# Patient Record
Sex: MALE | Race: WHITE | NOT HISPANIC OR LATINO | Employment: OTHER | URBAN - METROPOLITAN AREA
[De-identification: names, ages, dates, MRNs, and addresses within clinical notes are randomized per-mention and may not be internally consistent; named-entity substitution may affect disease eponyms.]

---

## 2017-06-01 ENCOUNTER — TELEPHONE (OUTPATIENT)
Dept: FAMILY MEDICINE | Facility: CLINIC | Age: 47
End: 2017-06-01

## 2017-06-01 NOTE — TELEPHONE ENCOUNTER
JULIEN Cadena, with [facility] clinic called statin. Patient has tenderness, swelling, hot to the touch and pain of right elbow   A. Tetanus up to date   B. Appears to be a skin infection but unable to do any x-ray at [facility] clinic to examine joint further   C. Can patient be seen tonight?   D. Patient afebrile and no streaking present    Writer informed Tammi:  1. No available appts today in clinic but there would be availability tomorrow    Per Tammi:  1. Appt tomorrow is fine.  Appt scheduled on 17 with Dr. Chang.  Appt date, time and location confirmed with Tammi    Writer heard Tammi say to patient while writer scheduling appt:  1. If symptoms worsen, or fever and/or streaking develop, go to Urgent Care tonight or MARIA LUISA Maloney, MOREN, RN

## 2017-06-02 ENCOUNTER — RADIANT APPOINTMENT (OUTPATIENT)
Dept: GENERAL RADIOLOGY | Facility: CLINIC | Age: 47
End: 2017-06-02
Attending: FAMILY MEDICINE
Payer: COMMERCIAL

## 2017-06-02 ENCOUNTER — OFFICE VISIT (OUTPATIENT)
Dept: FAMILY MEDICINE | Facility: CLINIC | Age: 47
End: 2017-06-02
Payer: COMMERCIAL

## 2017-06-02 VITALS
BODY MASS INDEX: 26.49 KG/M2 | SYSTOLIC BLOOD PRESSURE: 136 MMHG | RESPIRATION RATE: 16 BRPM | HEIGHT: 68 IN | HEART RATE: 74 BPM | OXYGEN SATURATION: 98 % | DIASTOLIC BLOOD PRESSURE: 78 MMHG | WEIGHT: 174.75 LBS | TEMPERATURE: 98.8 F

## 2017-06-02 DIAGNOSIS — L03.113 CELLULITIS OF RIGHT ELBOW: ICD-10-CM

## 2017-06-02 DIAGNOSIS — L03.113 CELLULITIS OF RIGHT ELBOW: Primary | ICD-10-CM

## 2017-06-02 LAB
CRP SERPL-MCNC: 28 MG/L (ref 0–8)
ERYTHROCYTE [DISTWIDTH] IN BLOOD BY AUTOMATED COUNT: 13.7 % (ref 10–15)
ERYTHROCYTE [SEDIMENTATION RATE] IN BLOOD BY WESTERGREN METHOD: 7 MM/H (ref 0–15)
HCT VFR BLD AUTO: 45.7 % (ref 40–53)
HGB BLD-MCNC: 15.3 G/DL (ref 13.3–17.7)
MCH RBC QN AUTO: 30.2 PG (ref 26.5–33)
MCHC RBC AUTO-ENTMCNC: 33.5 G/DL (ref 31.5–36.5)
MCV RBC AUTO: 90 FL (ref 78–100)
PLATELET # BLD AUTO: 217 10E9/L (ref 150–450)
RBC # BLD AUTO: 5.07 10E12/L (ref 4.4–5.9)
WBC # BLD AUTO: 7.5 10E9/L (ref 4–11)

## 2017-06-02 PROCEDURE — 85652 RBC SED RATE AUTOMATED: CPT | Performed by: FAMILY MEDICINE

## 2017-06-02 PROCEDURE — 73080 X-RAY EXAM OF ELBOW: CPT | Mod: RT

## 2017-06-02 PROCEDURE — 85027 COMPLETE CBC AUTOMATED: CPT | Performed by: FAMILY MEDICINE

## 2017-06-02 PROCEDURE — 99213 OFFICE O/P EST LOW 20 MIN: CPT | Performed by: FAMILY MEDICINE

## 2017-06-02 PROCEDURE — 36415 COLL VENOUS BLD VENIPUNCTURE: CPT | Performed by: FAMILY MEDICINE

## 2017-06-02 PROCEDURE — 86140 C-REACTIVE PROTEIN: CPT | Performed by: FAMILY MEDICINE

## 2017-06-02 RX ORDER — CEPHALEXIN 500 MG/1
500 CAPSULE ORAL 3 TIMES DAILY
Qty: 30 CAPSULE | Refills: 0 | Status: SHIPPED | OUTPATIENT
Start: 2017-06-02 | End: 2017-12-13

## 2017-06-02 NOTE — MR AVS SNAPSHOT
"              After Visit Summary   6/2/2017    Joaquin Dacosta    MRN: [id number]           Patient Information     Date Of Birth          1970        Visit Information        Provider Department      6/2/2017 9:00 AM Lefty Chang MD [facility]        Today's Diagnoses     Cellulitis of right elbow    -  1       Follow-ups after your visit        Who to contact     If you have questions or need follow up information about today's clinic visit or your schedule please contact [facility] directly at [phone].  Normal or non-critical lab and imaging results will be communicated to you by GetApphart, letter or phone within 4 business days after the clinic has received the results. If you do not hear from us within 7 days, please contact the clinic through NewCondosOnlinet or phone. If you have a critical or abnormal lab result, we will notify you by phone as soon as possible.  Submit refill requests through CompuMed or call your pharmacy and they will forward the refill request to us. Please allow 3 business days for your refill to be completed.          Additional Information About Your Visit        MyChart Information     CompuMed gives you secure access to your electronic health record. If you see a primary care provider, you can also send messages to your care team and make appointments. If you have questions, please call your primary care clinic.  If you do not have a primary care provider, please call [phone] and they will assist you.        Care EveryWhere ID     This is your Care EveryWhere ID. This could be used by other organizations to access your [facility] medical records  [id number]        Your Vitals Were     Pulse Temperature Respirations Height Pulse Oximetry BMI (Body Mass Index)    74 98.8  F (37.1  C) (Oral) 16 5' 8\" (1.727 m) 98% 26.57 kg/m2       Blood Pressure from Last 3 Encounters:   06/02/17 136/78   12/21/16 112/80   07/18/16 110/82    Weight from " Last 3 Encounters:   06/02/17 174 lb 12 oz (79.3 kg)   12/21/16 174 lb (78.9 kg)   07/18/16 168 lb (76.2 kg)              We Performed the Following     CBC with platelets     CRP, inflammation     ESR: Erythrocyte sedimentation rate          Today's Medication Changes          These changes are accurate as of: 6/2/17  1:13 PM.  If you have any questions, ask your nurse or doctor.               Start taking these medicines.        Dose/Directions    cephALEXin 500 MG capsule   Commonly known as:  KEFLEX   Used for:  Cellulitis of right elbow   Started by:  Lefty Chang MD        Dose:  500 mg   Take 1 capsule (500 mg) by mouth 3 times daily   Quantity:  30 capsule   Refills:  0            Where to get your medicines      These medications were sent to [facility] [address] [place] [id number]     Phone:  [phone]     cephALEXin 500 MG capsule                Primary Care Provider Office Phone # Fax #    Lefty Chang [phone]       [address] [address]        Thank you!     Thank you for choosing [facility]  for your care. Our goal is always to provide you with excellent care. Hearing back from our patients is one way we can continue to improve our services. Please take a few minutes to complete the written survey that you may receive in the mail after your visit with us. Thank you!             Your Updated Medication List - Protect others around you: Learn how to safely use, store and throw away your medicines at www.disposemymeds.org.          This list is accurate as of: 6/2/17  1:13 PM.  Always use your most recent med list.                   Brand Name Dispense Instructions for use    ALLEGRA PO      Take  by mouth.       cephALEXin 500 MG capsule    KEFLEX    30 capsule    Take 1 capsule (500 mg) by mouth 3 times daily       melatonin 3 MG Caps       Take 5 mg by mouth       Multi-vitamin Tabs tablet   Generic drug:  multivitamin, therapeutic with minerals     0    1 tab QD       pirbuterol 200 MCG/INH Inhaler    MAXAIR AUTOHALER    1 Inhaler    Inhale 2 puffs into the lungs every 4 hours as needed for wheezing or shortness of breath / dyspnea.       simvastatin 20 MG tablet    ZOCOR    90 tablet    Take 1 tablet (20 mg) by mouth At Bedtime       UNABLE TO FIND      MEDICATION NAME: digestive advantage, OTC       valACYclovir 1000 mg tablet    VALTREX    10 tablet    Take 2 tablets (2,000 mg) by mouth 2 times daily

## 2017-06-02 NOTE — PROGRESS NOTES
SUBJECTIVE:                                                    Joaquin Dacosta is a 46 year old male who presents to clinic today for the following health issues:      Musculoskeletal problem/pain      Duration: 2 days at 2:00am    Description  Location: right elbow    Intensity:  moderate    Accompanying signs and symptoms: warmth, swelling, redness and pain with movement and redness is spreading     History  Previous similar problem: YES- similar with left knee   Previous evaluation:  [facility]     Precipitating or alleviating factors:  Trauma or overuse: no   Aggravating factors include: bumping elbow and pain with movement     Therapies tried and outcome: ice and NSAID - ibuprofen     Additional: [facility] and had a Lumbee drawn and redness has grown      2 days ago - work up with pain.     Bumped few days ago but it was not really hard.     No fever. When started hurting - felt like pulled muscle pain.   Tried - ice - did not help  Ibuprofen helped.    No travel. No history of gout.     Went to [facility] clinic and they marked it and now redness is spreading beyond that.   Currently not painful. This morning - painful.     Problem list and histories reviewed & adjusted, as indicated.  Additional history: as documented    Labs reviewed in EPIC    Reviewed and updated as needed this visit by clinical staff    Reviewed and updated as needed this visit by Provider      Social History     Social History     Marital status:      Spouse name: Ani     Number of children: 2     Years of education: 13     Occupational History     [employer]       Self Employed.     Social History Main Topics     Smoking status: Former Smoker     Smokeless tobacco: Never Used      Comment: tried in  college     Alcohol use Yes      Comment: about  1 -2 drink per day- glass of wine at dinner     Drug use: No     Sexual activity: Yes     Partners: Female     Birth control/ protection:  "Condom     Other Topics Concern      Service No     Blood Transfusions No     Caffeine Concern Not Asked     1 - 2 cups of coffee a day.      Exercise No     Has a physical job. No other exercise besides work.     Seat Belt Yes     Self-Exams No     Parent/Sibling W/ Cabg, Mi Or Angioplasty Before 65f 55m? No     Social History Narrative    11/20/2009    Balanced Diet - Yes    Osteoporosis Preventative measures-  Dairy servings per day: four serving per day    Regular Exercise -  No Describe     Dental Exam up - YES - Date: 06/2009    Eye Exam - NO    Self Testicular Exam -  No    Do you have any concerns about STD's -  No    Abuse: Current or Past (Physical, Sexual or Emotional)- No    Do you feel safe in your environment - Yes    Guns stored in the home - Not Asked    Sunscreen used - Yes    Seatbelts used - Yes    Lipids - YES - Date: 11/20/2009    Glucose -  YES - Date: 11/20/2009    Colon Cancer Screening - No    Hemoccults - NO    PSA - NO    Digital Rectal Exam - YES - Date: 11/20/2009    Immunizations reviewed and up to date - Yes'     Allergies   Allergen Reactions     No Known Drug Allergies      Patient Active Problem List   Diagnosis     Allergic rhinitis     Recurrent herpes labialis     HYPERLIPIDEMIA LDL GOAL <130     Moderate persistent asthma     Reviewed medications, social history and  past medical and surgical history.    Review of system: for general, respiratory, CVS, GI and psychiatry negative except for noted above.     EXAM:  /78 (Cuff Size: Adult Regular)  Pulse 74  Temp 98.8  F (37.1  C) (Oral)  Resp 16  Ht 5' 8\" (1.727 m)  Wt 174 lb 12 oz (79.3 kg)  SpO2 98%  BMI 26.57 kg/m2  Constitutional: healthy, alert and no distress   Psychiatric: mentation appears normal and affect normal/bright  Skin - right elbow : on external surface - around 8 cm x 8 cm erythematous lesion with raised temp, mild tenderness and some bony tenderness, very tiny scab in center.     ASSESSMENT / " PLAN:  (L03.113) Cellulitis of right elbow  (primary encounter diagnosis)  Comment: from appearance. Cbc negative. CRP and ESR pending. Xray negative on my reading. [facility]ed [facility] call. Start with keflex and if getting worse over weekend - go to ER. He agreed.   Plan: CBC with platelets, CRP, inflammation, ESR:         Erythrocyte sedimentation rate, cephALEXin         (KEFLEX) 500 MG capsule, CANCELED: XR Elbow         Right 2 Views

## 2017-06-02 NOTE — NURSING NOTE
"Chief Complaint   Patient presents with     Musculoskeletal Problem     right elbow        Initial /78 (Cuff Size: Adult Regular)  Pulse 74  Temp 98.8  F (37.1  C) (Oral)  Resp 16  Ht 5' 8\" (1.727 m)  Wt 174 lb 12 oz (79.3 kg)  SpO2 98%  BMI 26.57 kg/m2 Estimated body mass index is 26.57 kg/(m^2) as calculated from the following:    Height as of this encounter: 5' 8\" (1.727 m).    Weight as of this encounter: 174 lb 12 oz (79.3 kg).  Medication Reconciliation: complete     Sofia Guerrero CMA      "

## 2017-06-03 ASSESSMENT — ASTHMA QUESTIONNAIRES: ACT_TOTALSCORE: 25

## 2017-12-06 DIAGNOSIS — E78.5 HYPERLIPIDEMIA LDL GOAL <130: ICD-10-CM

## 2017-12-06 NOTE — TELEPHONE ENCOUNTER
Medication Detail      Disp Refills Start End JOSE DANIEL   simvastatin (ZOCOR) 20 MG tablet 90 tablet 3 12/22/2016  No   Sig: Take 1 tablet (20 mg) by mouth At Bedtime     lov 6/2/17

## 2017-12-07 RX ORDER — SIMVASTATIN 20 MG
TABLET ORAL
Qty: 30 TABLET | Refills: 0 | Status: SHIPPED | OUTPATIENT
Start: 2017-12-07 | End: 2017-12-13

## 2017-12-07 NOTE — TELEPHONE ENCOUNTER
Requested Prescriptions   Pending Prescriptions Disp Refills     simvastatin (ZOCOR) 20 MG tablet [Pharmacy Med Name: SIMVASTATIN 20 MG TABLET] 90 tablet 3     Sig: TAKE 1 TABLET BY MOUTH NIGHTLY AT BEDTIME.    Statins Protocol Passed    12/6/2017  9:49 AM       Passed - LDL on file in past 12 months    Recent Labs   Lab Test  12/21/16   0855   LDL  114*            Passed - No abnormal creatine kinase in past 12 months    No lab results found.         Passed - Recent or future visit with authorizing provider    Patient had office visit in the last year or has a visit in the next 30 days with authorizing provider.  See chart review.              Passed - Patient is age 18 or older

## 2017-12-07 NOTE — TELEPHONE ENCOUNTER
Updated health maintenance.   Refilled for a month.  Needs follow up office visit and follow up labs. Please notify patient.

## 2017-12-13 ENCOUNTER — TELEPHONE (OUTPATIENT)
Dept: FAMILY MEDICINE | Facility: CLINIC | Age: 47
End: 2017-12-13

## 2017-12-13 ENCOUNTER — OFFICE VISIT (OUTPATIENT)
Dept: FAMILY MEDICINE | Facility: CLINIC | Age: 47
End: 2017-12-13
Payer: COMMERCIAL

## 2017-12-13 VITALS
TEMPERATURE: 98.1 F | WEIGHT: 181 LBS | SYSTOLIC BLOOD PRESSURE: 123 MMHG | RESPIRATION RATE: 12 BRPM | OXYGEN SATURATION: 97 % | DIASTOLIC BLOOD PRESSURE: 82 MMHG | BODY MASS INDEX: 27.43 KG/M2 | HEART RATE: 64 BPM | HEIGHT: 68 IN

## 2017-12-13 DIAGNOSIS — J45.20 MILD INTERMITTENT ASTHMA WITHOUT COMPLICATION: Primary | ICD-10-CM

## 2017-12-13 DIAGNOSIS — B00.1 RECURRENT HERPES LABIALIS: ICD-10-CM

## 2017-12-13 DIAGNOSIS — E78.5 HYPERLIPIDEMIA LDL GOAL <130: ICD-10-CM

## 2017-12-13 DIAGNOSIS — Z00.00 ROUTINE GENERAL MEDICAL EXAMINATION AT A HEALTH CARE FACILITY: Primary | ICD-10-CM

## 2017-12-13 DIAGNOSIS — J45.20 MILD INTERMITTENT ASTHMA WITHOUT COMPLICATION: ICD-10-CM

## 2017-12-13 DIAGNOSIS — Z23 NEED FOR PROPHYLACTIC VACCINATION AND INOCULATION AGAINST INFLUENZA: ICD-10-CM

## 2017-12-13 LAB
ALBUMIN SERPL-MCNC: 4.1 G/DL (ref 3.4–5)
ALP SERPL-CCNC: 60 U/L (ref 40–150)
ALT SERPL W P-5'-P-CCNC: 42 U/L (ref 0–70)
ANION GAP SERPL CALCULATED.3IONS-SCNC: 8 MMOL/L (ref 3–14)
AST SERPL W P-5'-P-CCNC: 22 U/L (ref 0–45)
BILIRUB SERPL-MCNC: 0.7 MG/DL (ref 0.2–1.3)
BUN SERPL-MCNC: 16 MG/DL (ref 7–30)
CALCIUM SERPL-MCNC: 9.3 MG/DL (ref 8.5–10.1)
CHLORIDE SERPL-SCNC: 107 MMOL/L (ref 94–109)
CHOLEST SERPL-MCNC: 217 MG/DL
CO2 SERPL-SCNC: 25 MMOL/L (ref 20–32)
CREAT SERPL-MCNC: 0.97 MG/DL (ref 0.66–1.25)
GFR SERPL CREATININE-BSD FRML MDRD: 82 ML/MIN/1.7M2
GLUCOSE SERPL-MCNC: 96 MG/DL (ref 70–99)
HDLC SERPL-MCNC: 83 MG/DL
LDLC SERPL CALC-MCNC: 116 MG/DL
NONHDLC SERPL-MCNC: 134 MG/DL
POTASSIUM SERPL-SCNC: 4 MMOL/L (ref 3.4–5.3)
PROT SERPL-MCNC: 7.6 G/DL (ref 6.8–8.8)
SODIUM SERPL-SCNC: 140 MMOL/L (ref 133–144)
TRIGL SERPL-MCNC: 91 MG/DL

## 2017-12-13 PROCEDURE — 99396 PREV VISIT EST AGE 40-64: CPT | Mod: 25 | Performed by: FAMILY MEDICINE

## 2017-12-13 PROCEDURE — 80053 COMPREHEN METABOLIC PANEL: CPT | Performed by: FAMILY MEDICINE

## 2017-12-13 PROCEDURE — 90471 IMMUNIZATION ADMIN: CPT | Performed by: FAMILY MEDICINE

## 2017-12-13 PROCEDURE — 80061 LIPID PANEL: CPT | Performed by: FAMILY MEDICINE

## 2017-12-13 PROCEDURE — 90686 IIV4 VACC NO PRSV 0.5 ML IM: CPT | Performed by: FAMILY MEDICINE

## 2017-12-13 PROCEDURE — 36415 COLL VENOUS BLD VENIPUNCTURE: CPT | Performed by: FAMILY MEDICINE

## 2017-12-13 RX ORDER — SIMVASTATIN 20 MG
TABLET ORAL
Qty: 90 TABLET | Refills: 3 | Status: SHIPPED | OUTPATIENT
Start: 2017-12-13 | End: 2018-12-01

## 2017-12-13 RX ORDER — VALACYCLOVIR HYDROCHLORIDE 1 G/1
TABLET, FILM COATED ORAL
Qty: 4 TABLET | Refills: 11 | Status: SHIPPED | OUTPATIENT
Start: 2017-12-13 | End: 2018-12-07

## 2017-12-13 RX ORDER — ALBUTEROL SULFATE 90 UG/1
2 AEROSOL, METERED RESPIRATORY (INHALATION) EVERY 6 HOURS PRN
Qty: 1 INHALER | Refills: 0 | Status: SHIPPED | OUTPATIENT
Start: 2017-12-13 | End: 2018-12-07

## 2017-12-13 NOTE — MR AVS SNAPSHOT
After Visit Summary   12/13/2017    Joaquin Dacosta    MRN: [id number]           Patient Information     Date Of Birth          1970        Visit Information        Provider Department      12/13/2017 8:00 AM Lefty Chang MD [facility]        Today's Diagnoses     Routine general medical examination at a health care facility    -  1    Need for prophylactic vaccination and inoculation against influenza        Mild intermittent asthma without complication        Recurrent herpes labialis        Hyperlipidemia LDL goal <130          Care Instructions      Preventive Health Recommendations  Male Ages 40 to 49    Yearly exam:             See your health care provider every year in order to  o   Review health changes.   o   Discuss preventive care.    o   Review your medicines if your doctor has prescribed any.    You should be tested each year for STDs (sexually transmitted diseases) if you re at risk.     Have a cholesterol test every 5 years.     Have a colonoscopy (test for colon cancer) if someone in your family has had colon cancer or polyps before age 50.     After age 45, have a diabetes test (fasting glucose). If you are at risk for diabetes, you should have this test every 3 years.      Talk with your health care provider about whether or not a prostate cancer screening test (PSA) is right for you.    Shots: Get a flu shot each year. Get a tetanus shot every 10 years.     Nutrition:    Eat at least 5 servings of fruits and vegetables daily.     Eat whole-grain bread, whole-wheat pasta and brown rice instead of white grains and rice.     Talk to your provider about Calcium and Vitamin D.     Lifestyle    Exercise for at least 150 minutes a week (30 minutes a day, 5 days a week). This will help you control your weight and prevent disease.     Limit alcohol to one drink per day.     No smoking.     Wear sunscreen to prevent skin cancer.     See your dentist every six  "months for an exam and cleaning.              Follow-ups after your visit        Who to contact     If you have questions or need follow up information about today's clinic visit or your schedule please contact [facility] directly at [phone].  Normal or non-critical lab and imaging results will be communicated to you by NuPathehart, letter or phone within 4 business days after the clinic has received the results. If you do not hear from us within 7 days, please contact the clinic through NuPathehart or phone. If you have a critical or abnormal lab result, we will notify you by phone as soon as possible.  Submit refill requests through ZANY OX or call your pharmacy and they will forward the refill request to us. Please allow 3 business days for your refill to be completed.          Additional Information About Your Visit        NuPatheharAcumentrics Information     ZANY OX gives you secure access to your electronic health record. If you see a primary care provider, you can also send messages to your care team and make appointments. If you have questions, please call your primary care clinic.  If you do not have a primary care provider, please call [phone] and they will assist you.        Care EveryWhere ID     This is your Care EveryWhere ID. This could be used by other organizations to access your [facility] medical records  [id number]        Your Vitals Were     Pulse Temperature Respirations Height Pulse Oximetry BMI (Body Mass Index)    64 98.1  F (36.7  C) (Oral) 12 5' 8\" (1.727 m) 97% 27.52 kg/m2       Blood Pressure from Last 3 Encounters:   12/13/17 123/82   06/02/17 136/78   12/21/16 112/80    Weight from Last 3 Encounters:   12/13/17 181 lb (82.1 kg)   06/02/17 174 lb 12 oz (79.3 kg)   12/21/16 174 lb (78.9 kg)              We Performed the Following     Comprehensive metabolic panel     FLU VAC, SPLIT VIRUS IM > 3 YO (QUADRIVALENT) [16520]     Lipid panel reflex to direct LDL Fasting     Vaccine " Administration, Initial [95667]          Today's Medication Changes          These changes are accurate as of: 12/13/17  8:38 AM.  If you have any questions, ask your nurse or doctor.               These medicines have changed or have updated prescriptions.        Dose/Directions    pirbuterol 200 MCG/INH Inhaler   Commonly known as:  MAXAIR   This may have changed:    - when to take this  - reasons to take this   Used for:  Mild intermittent asthma without complication   Changed by:  Lefty Chang MD        Dose:  2 puff   Inhale 2 puffs into the lungs 4 times daily   Quantity:  1 Inhaler   Refills:  0       simvastatin 20 MG tablet   Commonly known as:  ZOCOR   This may have changed:  See the new instructions.   Used for:  Hyperlipidemia LDL goal <130   Changed by:  Lefty Chang MD        TAKE 1 TABLET BY MOUTH NIGHTLY AT BEDTIME.   Quantity:  90 tablet   Refills:  3       valACYclovir 1000 mg tablet   Commonly known as:  VALTREX   This may have changed:    - how much to take  - how to take this  - when to take this  - additional instructions   Used for:  Recurrent herpes labialis   Changed by:  Lefty Chang MD        Take 2 tablets twice per day at the onset of cold sore.   Quantity:  4 tablet   Refills:  11            Where to get your medicines      These medications were sent to [place] [address] [place] [address] [address]     Phone:  [phone]     pirbuterol 200 MCG/INH Inhaler    simvastatin 20 MG tablet    valACYclovir 1000 mg tablet                Primary Care Provider Office Phone # Fax #    Lefty Chang [phone] [address] [place] [address]        Equal Access to Services     [facility] : ilya Aparicio, jen andre. So [facility] [phone].    ATENCIÓN: edison Mcmanus  a hodges disposición servicios gratuitos de asistencia lingüística. Remigio ho [phone].    We comply with applicable federal civil rights laws and [place] laws. We do not discriminate on the basis of race, color, national origin, age, disability, sex, sexual orientation, or gender identity.            Thank you!     Thank you for choosing [facility]  for your care. Our goal is always to provide you with excellent care. Hearing back from our patients is one way we can continue to improve our services. Please take a few minutes to complete the written survey that you may receive in the mail after your visit with us. Thank you!             Your Updated Medication List - Protect others around you: Learn how to safely use, store and throw away your medicines at www.disposemymeds.org.          This list is accurate as of: 12/13/17  8:38 AM.  Always use your most recent med list.                   Brand Name Dispense Instructions for use Diagnosis    ALLEGRA PO      Take  by mouth.    Allergic rhinitis, cause unspecified       ASPIRIN PO      Take 81 mg by mouth daily        melatonin 3 MG Caps      Take 5 mg by mouth        Multi-vitamin Tabs tablet   Generic drug:  multivitamin, therapeutic with minerals     0    1 tab QD        pirbuterol 200 MCG/INH Inhaler    MAXAIR    1 Inhaler    Inhale 2 puffs into the lungs 4 times daily    Mild intermittent asthma without complication       simvastatin 20 MG tablet    ZOCOR    90 tablet    TAKE 1 TABLET BY MOUTH NIGHTLY AT BEDTIME.    Hyperlipidemia LDL goal <130       UNABLE TO FIND      MEDICATION NAME: digestive advantage, OTC        valACYclovir 1000 mg tablet    VALTREX    4 tablet    Take 2 tablets twice per day at the onset of cold sore.    Recurrent herpes labialis

## 2017-12-13 NOTE — TELEPHONE ENCOUNTER
maxair inhaler that was requested is no longer available please send new rx for different inhaler-proair/ventolinlo  lov 12/13/17

## 2017-12-13 NOTE — NURSING NOTE
Per orders of Dr. Chang, injection of flu shot  given by MATHIEU CONCEPCION Patient instructed to remain in clinic for 15 minutes afterwards, and to report any adverse reaction to me immediately.

## 2017-12-14 ASSESSMENT — ASTHMA QUESTIONNAIRES: ACT_TOTALSCORE: 23

## 2018-06-06 ENCOUNTER — RADIANT APPOINTMENT (OUTPATIENT)
Dept: GENERAL RADIOLOGY | Facility: CLINIC | Age: 48
End: 2018-06-06
Attending: INTERNAL MEDICINE
Payer: COMMERCIAL

## 2018-06-06 ENCOUNTER — OFFICE VISIT (OUTPATIENT)
Dept: FAMILY MEDICINE | Facility: CLINIC | Age: 48
End: 2018-06-06
Payer: COMMERCIAL

## 2018-06-06 VITALS
HEART RATE: 83 BPM | WEIGHT: 174 LBS | DIASTOLIC BLOOD PRESSURE: 89 MMHG | HEIGHT: 70 IN | SYSTOLIC BLOOD PRESSURE: 141 MMHG | TEMPERATURE: 97.8 F | OXYGEN SATURATION: 97 % | BODY MASS INDEX: 24.91 KG/M2

## 2018-06-06 DIAGNOSIS — S93.401A SPRAIN OF RIGHT ANKLE, UNSPECIFIED LIGAMENT, INITIAL ENCOUNTER: ICD-10-CM

## 2018-06-06 DIAGNOSIS — S99.911A ANKLE INJURY, RIGHT, INITIAL ENCOUNTER: ICD-10-CM

## 2018-06-06 DIAGNOSIS — S99.911A ANKLE INJURY, RIGHT, INITIAL ENCOUNTER: Primary | ICD-10-CM

## 2018-06-06 PROCEDURE — 99213 OFFICE O/P EST LOW 20 MIN: CPT | Performed by: INTERNAL MEDICINE

## 2018-06-06 PROCEDURE — 73610 X-RAY EXAM OF ANKLE: CPT | Mod: RT

## 2018-06-06 ASSESSMENT — ANXIETY QUESTIONNAIRES
GAD7 TOTAL SCORE: 0
6. BECOMING EASILY ANNOYED OR IRRITABLE: NOT AT ALL
2. NOT BEING ABLE TO STOP OR CONTROL WORRYING: NOT AT ALL
IF YOU CHECKED OFF ANY PROBLEMS ON THIS QUESTIONNAIRE, HOW DIFFICULT HAVE THESE PROBLEMS MADE IT FOR YOU TO DO YOUR WORK, TAKE CARE OF THINGS AT HOME, OR GET ALONG WITH OTHER PEOPLE: NOT DIFFICULT AT ALL
3. WORRYING TOO MUCH ABOUT DIFFERENT THINGS: NOT AT ALL
7. FEELING AFRAID AS IF SOMETHING AWFUL MIGHT HAPPEN: NOT AT ALL
5. BEING SO RESTLESS THAT IT IS HARD TO SIT STILL: NOT AT ALL
1. FEELING NERVOUS, ANXIOUS, OR ON EDGE: NOT AT ALL

## 2018-06-06 ASSESSMENT — PATIENT HEALTH QUESTIONNAIRE - PHQ9: 5. POOR APPETITE OR OVEREATING: NOT AT ALL

## 2018-06-06 NOTE — PATIENT INSTRUCTIONS
ibuprofen 3 x day with food   ice, elevate, rest, splint: tie up splint  Start range of motion / ABCs in few days to maintain range of motion     Recheck with Podiatry if not improved as expected.    Ankle sprain handout given        Self-Care for Strains and Sprains  Most minor strains and sprains can be treated with self-care. Recovering from a strain or sprain may take 6 to 8 weeks. Your self-care goal is to reduce pain and immobilize the injury to speed healing.     A sprain injures ligaments (tissue that connects bones to bones).      A strain injures muscles or tendons (tissue that connects muscles to bones).   Support the injured area  Wrapping the injured area provides support for short, necessary activities. Be careful not to wrap the area too tightly. This could cut off the blood supply.    Support a wrist, elbow, or shoulder with a sling.    Wrap an ankle or knee with an elastic bandage.    Tape a finger or toe to the one next to it.  Use cold and heat  Cold reduces swelling. Both cold and heat reduce pain. Heat should not be used in the initial treatment of the injury. When using cold or heat, always place a towel between the pack and your skin.    Apply ice or a cold pack 10 to 15 minutes every hour you re awake for the first 2 days.    After the swelling goes down, use cold or heat to control pain. Don t use heat late in the day, since it can cause swelling when you re not active.  Rest and elevate  Rest and elevation help your injury heal faster.    Raise the injured area above your heart level.    Keep the injured area from moving.    Limit the use of the joint or limb.  Use medicine    Aspirin reduces pain and swelling. (Note: Don t give aspirin to a child 18 or younger unless prescribed by the doctor.)    Aspirin substitutes, such as ibuprofen, can reduce pain. Some substitutes reduce swelling, too. Ask your pharmacist which substitutes you can use.  Call your doctor if:    The injured joint won t  move, or bones make a grating sound when they move.    You can t put weight on the injured area, even after 24 hours.    The injured body part is cold, blue, or numb.    The joint or limb appears bent or crooked.    Pain increases or doesn t improve in 4 days.    When pressing along the injured area, you notice a spot that is especially painful.   Date Last Reviewed: 9/29/2015 2000-2017 [employer]. [address] [place] [address], [place] [address]. All rights reserved. This information is not intended as a substitute for professional medical care. Always follow your healthcare professional's instructions.

## 2018-06-06 NOTE — MR AVS SNAPSHOT
After Visit Summary   6/6/2018    Joaquin Dacosta    MRN: [id number]           Patient Information     Date Of Birth          1970        Visit Information        Provider Department      6/6/2018 3:00 PM Bing Harris MD [facility]        Today's Diagnoses     Ankle injury, right, initial encounter    -  1    Sprain of right ankle, unspecified ligament, initial encounter          Care Instructions    ibuprofen 3 x day with food   ice, elevate, rest, splint: tie up splint  Start range of motion / ABCs in few days to maintain range of motion     Recheck with Podiatry if not improved as expected.                Follow-ups after your visit        Who to contact     If you have questions or need follow up information about today's clinic visit or your schedule please contact [facility] directly at [phone].  Normal or non-critical lab and imaging results will be communicated to you by [employer], letter or phone within 4 business days after the clinic has received the results. If you do not hear from us within 7 days, please contact the clinic through [employer] or phone. If you have a critical or abnormal lab result, we will notify you by phone as soon as possible.  Submit refill requests through [employer] or call your pharmacy and they will forward the refill request to us. Please allow 3 business days for your refill to be completed.          Additional Information About Your Visit        MyChart Information     [employer] gives you secure access to your electronic health record. If you see a primary care provider, you can also send messages to your care team and make appointments. If you have questions, please call your primary care clinic.  If you do not have a primary care provider, please call [phone] and they will assist you.        Care EveryWhere ID     This is your Care EveryWhere ID. This could be used by other organizations to access your  "[place] medical records  [id number]        Your Vitals Were     Pulse Temperature Height Pulse Oximetry BMI (Body Mass Index)       83 97.8  F (36.6  C) (Tympanic) 5' 9.5\" (1.765 m) 97% 25.33 kg/m2        Blood Pressure from Last 3 Encounters:   06/06/18 141/89   12/13/17 123/82   06/02/17 136/78    Weight from Last 3 Encounters:   06/06/18 174 lb (78.9 kg)   12/13/17 181 lb (82.1 kg)   06/02/17 174 lb 12 oz (79.3 kg)                 Today's Medication Changes          These changes are accurate as of 6/6/18  3:43 PM.  If you have any questions, ask your nurse or doctor.               Start taking these medicines.        Dose/Directions    order for DME   Used for:  Sprain of right ankle, unspecified ligament, initial encounter, Ankle injury, right, initial encounter   Started by:  Bing Harris MD        Equipment being ordered: tie up ankle splint   Quantity:  1 Device   Refills:  0            Where to get your medicines      Some of these will need a paper prescription and others can be bought over the counter.  Ask your nurse if you have questions.     Bring a paper prescription for each of these medications     order for DME                Primary Care Provider Office Phone # Fax #    Lefty Lakshmi Chagn [phone] [address] [place] [id number]        Equal Access to Services     [facility] NATHANIEL AH: Hadii christian wong Solori, waaxda luqadaha, qaybta kaalmada darnell, jen echevarria. So [facility] [phone].    ATENCIÓN: Si habla español, tiene a hodges disposición servicios gratuitos de asistencia lingüística. Brantame al [phone].    We comply with applicable federal civil rights laws and [place] laws. We do not discriminate on the basis of race, color, national origin, age, disability, sex, sexual orientation, or gender identity.            Thank you!     Thank you for choosing [facility]  for your care. Our goal is " always to provide you with excellent care. Hearing back from our patients is one way we can continue to improve our services. Please take a few minutes to complete the written survey that you may receive in the mail after your visit with us. Thank you!             Your Updated Medication List - Protect others around you: Learn how to safely use, store and throw away your medicines at www.disposemymeds.org.          This list is accurate as of 6/6/18  3:43 PM.  Always use your most recent med list.                   Brand Name Dispense Instructions for use Diagnosis    albuterol 108 (90 Base) MCG/ACT Inhaler    PROAIR HFA/PROVENTIL HFA/VENTOLIN HFA    1 Inhaler    Inhale 2 puffs into the lungs every 6 hours as needed for shortness of breath / dyspnea or wheezing    Mild intermittent asthma without complication       ALLEGRA PO      Take  by mouth.    Allergic rhinitis, cause unspecified       ASPIRIN PO      Take 81 mg by mouth daily        melatonin 3 MG Caps      Take 5 mg by mouth        Multi-vitamin Tabs tablet   Generic drug:  multivitamin, therapeutic with minerals     0    1 tab QD        order for DME     1 Device    Equipment being ordered: tie up ankle splint    Sprain of right ankle, unspecified ligament, initial encounter, Ankle injury, right, initial encounter       simvastatin 20 MG tablet    ZOCOR    90 tablet    TAKE 1 TABLET BY MOUTH NIGHTLY AT BEDTIME.    Hyperlipidemia LDL goal <130       UNABLE TO FIND      MEDICATION NAME: digestive advantage, OTC        valACYclovir 1000 mg tablet    VALTREX    4 tablet    Take 2 tablets twice per day at the onset of cold sore.    Recurrent herpes labialis

## 2018-06-06 NOTE — PROGRESS NOTES
SUBJECTIVE:   Joaquin Dacosta is a 47 year old male presenting with a chief complaint of   Chief Complaint   Patient presents with     Trauma     Pt in clinic to have eval for right ankle pain due to injury.       He is an established patient of [facility].    MS Injury/Pain    Onset of symptoms was today   Location: right ankle  Context:       The injury happened while getting out of his work van      Mechanism: twisted ankle by setting foot out on rock.      Patient experienced immediate pain, immediate swelling, no deformity was noted by the patient, able to limp  Course of symptoms is worsening.      Current and Associated symptoms: Pain, Swelling and Decreased range of motion  Denies  Bruising  Aggravating Factors: weight-bearing  Therapies to improve symptoms include: ice, rest, elevation and wrapping with Ace bandage  This is not the first time this type of problem has occurred for this patient, but worse this time.       Review of Systems    Past Medical History:   Diagnosis Date     Allergic rhinitis, cause unspecified      Hyperlipidemia LDL goal <130      Moderate persistent asthma      Recurrent herpes labialis 10/28/2008    Cold Sores - Takes Valtrex .prn     Family History   Problem Relation Age of Onset     Breast Cancer Mother      Lipids Mother      Respiratory Father      asthma     Alzheimer Disease Maternal Grandmother      CANCER Maternal Grandmother      Cancer - colorectal Maternal Grandfather      Current Outpatient Prescriptions   Medication Sig Dispense Refill     albuterol (PROAIR HFA/PROVENTIL HFA/VENTOLIN HFA) 108 (90 BASE) MCG/ACT Inhaler Inhale 2 puffs into the lungs every 6 hours as needed for shortness of breath / dyspnea or wheezing 1 Inhaler 0     ASPIRIN PO Take 81 mg by mouth daily       Fexofenadine HCl (ALLEGRA PO) Take  by mouth.       melatonin 3 MG CAPS Take 5 mg by mouth        MULTI-VITAMIN OR TABS 1 tab QD  0 0     order for DME Equipment being ordered: tie up ankle splint  "1 Device 0     simvastatin (ZOCOR) 20 MG tablet TAKE 1 TABLET BY MOUTH NIGHTLY AT BEDTIME. 90 tablet 3     UNABLE TO FIND MEDICATION NAME: digestive advantage, OTC       valACYclovir (VALTREX) 1000 mg tablet Take 2 tablets twice per day at the onset of cold sore. 4 tablet 11     Social History   Substance Use Topics     Smoking status: Former Smoker     Smokeless tobacco: Never Used      Comment: tried in  college     Alcohol use Yes      Comment: about  1 -2 drink per day- glass of wine at dinner       OBJECTIVE  /89  Pulse 83  Temp 97.8  F (36.6  C) (Tympanic)  Ht 5' 9.5\" (1.765 m)  Wt 174 lb (78.9 kg)  SpO2 97%  BMI 25.33 kg/m2    Physical Exam   Constitutional: He appears well-developed and well-nourished.   Musculoskeletal:   right  ankle  Exam of the injured ankle reveals swelling, ecchymosis and tenderness over the lateral malleolus with swelling at medial ligaminets. No tenderness over the medial malleolus of the ankle. The fifth metatarsal is not tender.  Tarsals, metatarsals and toes nontender    The rest of the foot, ankle and leg exam is normal.         Labs:  No results found for this or any previous visit (from the past 24 hour(s)).    X-Ray was done, my findings are: no fracture     ASSESSMENT:      ICD-10-CM    1. Ankle injury, right, initial encounter S99.911A XR Ankle Right G/E 3 Views     order for DME   2. Sprain of right ankle, unspecified ligament, initial encounter S93.401A order for DME      Recommend f.u. Asthma with pcp    Medical Decision Making:    Differential Diagnosis:  MS Injury Pain: sprain and fracture    Serious Comorbid Conditions:  Adult:  None    PLAN:    MS Injury/Pain ice, elevate, rest, splint: tie up splint   and Ibuprofen    Followup:        Patient Instructions   ibuprofen 3 x day with food   ice, elevate, rest, splint: tie up splint  Start range of motion / ABCs in few days to maintain range of motion     Recheck with Podiatry if not improved as " expected.

## 2018-06-07 ASSESSMENT — PATIENT HEALTH QUESTIONNAIRE - PHQ9: SUM OF ALL RESPONSES TO PHQ QUESTIONS 1-9: 0

## 2018-06-07 ASSESSMENT — ANXIETY QUESTIONNAIRES: GAD7 TOTAL SCORE: 0

## 2018-12-01 DIAGNOSIS — E78.5 HYPERLIPIDEMIA LDL GOAL <130: ICD-10-CM

## 2018-12-03 RX ORDER — SIMVASTATIN 20 MG
TABLET ORAL
Qty: 90 TABLET | Refills: 0 | Status: SHIPPED | OUTPATIENT
Start: 2018-12-03 | End: 2018-12-07

## 2018-12-03 NOTE — TELEPHONE ENCOUNTER
Medication is being filled for 1 time refill only due to:  Patient needs labs fasting lipids.   Sandie Ling RN

## 2018-12-03 NOTE — TELEPHONE ENCOUNTER
"Requested Prescriptions   Pending Prescriptions Disp Refills     simvastatin (ZOCOR) 20 MG tablet [Pharmacy Med Name: SIMVASTATIN 20 MG TABLET]  Last Written Prescription Date:  12/13/2017  Last Fill Quantity: 90 tablet,  # refills: 3   Last Office Visit: 6/6/2018   Future Office Visit:      90 tablet 3     Sig: TAKE 1 TABLET BY MOUTH NIGHTLY AT BEDTIME.    Statins Protocol Passed    12/1/2018 10:49 AM       Passed - LDL on file in past 12 months    Recent Labs   Lab Test  12/13/17   0847   LDL  116*          Passed - No abnormal creatine kinase in past 12 months    Recent Labs   Lab Test  07/18/16   1024   CKT  79           Passed - Recent (12 mo) or future (30 days) visit within the authorizing provider's specialty    Patient had office visit in the last 12 months or has a visit in the next 30 days with authorizing provider or within the authorizing provider's specialty.  See \"Patient Info\" tab in inbasket, or \"Choose Columns\" in Meds & Orders section of the refill encounter.           Passed - Patient is age 18 or older          "

## 2018-12-07 ENCOUNTER — OFFICE VISIT (OUTPATIENT)
Dept: FAMILY MEDICINE | Facility: CLINIC | Age: 48
End: 2018-12-07
Payer: COMMERCIAL

## 2018-12-07 VITALS
OXYGEN SATURATION: 97 % | SYSTOLIC BLOOD PRESSURE: 118 MMHG | WEIGHT: 179.25 LBS | HEIGHT: 68 IN | HEART RATE: 63 BPM | RESPIRATION RATE: 15 BRPM | BODY MASS INDEX: 27.17 KG/M2 | DIASTOLIC BLOOD PRESSURE: 82 MMHG | TEMPERATURE: 97.6 F

## 2018-12-07 DIAGNOSIS — Z23 NEED FOR PROPHYLACTIC VACCINATION AND INOCULATION AGAINST INFLUENZA: ICD-10-CM

## 2018-12-07 DIAGNOSIS — J45.20 MILD INTERMITTENT ASTHMA WITHOUT COMPLICATION: ICD-10-CM

## 2018-12-07 DIAGNOSIS — Z00.00 ROUTINE GENERAL MEDICAL EXAMINATION AT A HEALTH CARE FACILITY: Primary | ICD-10-CM

## 2018-12-07 DIAGNOSIS — L30.9 ECZEMA, UNSPECIFIED TYPE: ICD-10-CM

## 2018-12-07 DIAGNOSIS — E78.5 HYPERLIPIDEMIA LDL GOAL <130: ICD-10-CM

## 2018-12-07 DIAGNOSIS — Z11.4 SCREENING FOR HIV WITHOUT PRESENCE OF RISK FACTORS: ICD-10-CM

## 2018-12-07 DIAGNOSIS — B00.1 RECURRENT HERPES LABIALIS: ICD-10-CM

## 2018-12-07 LAB
ALBUMIN SERPL-MCNC: 4.1 G/DL (ref 3.4–5)
ALP SERPL-CCNC: 63 U/L (ref 40–150)
ALT SERPL W P-5'-P-CCNC: 49 U/L (ref 0–70)
ANION GAP SERPL CALCULATED.3IONS-SCNC: 6 MMOL/L (ref 3–14)
AST SERPL W P-5'-P-CCNC: 26 U/L (ref 0–45)
BILIRUB SERPL-MCNC: 0.6 MG/DL (ref 0.2–1.3)
BUN SERPL-MCNC: 16 MG/DL (ref 7–30)
CALCIUM SERPL-MCNC: 9.6 MG/DL (ref 8.5–10.1)
CHLORIDE SERPL-SCNC: 105 MMOL/L (ref 94–109)
CHOLEST SERPL-MCNC: 243 MG/DL
CO2 SERPL-SCNC: 26 MMOL/L (ref 20–32)
CREAT SERPL-MCNC: 1 MG/DL (ref 0.66–1.25)
GFR SERPL CREATININE-BSD FRML MDRD: 79 ML/MIN/1.7M2
GLUCOSE SERPL-MCNC: 97 MG/DL (ref 70–99)
HDLC SERPL-MCNC: 74 MG/DL
HIV 1+2 AB+HIV1 P24 AG SERPL QL IA: NONREACTIVE
LDLC SERPL CALC-MCNC: 150 MG/DL
NONHDLC SERPL-MCNC: 169 MG/DL
POTASSIUM SERPL-SCNC: 4.3 MMOL/L (ref 3.4–5.3)
PROT SERPL-MCNC: 7.5 G/DL (ref 6.8–8.8)
SODIUM SERPL-SCNC: 137 MMOL/L (ref 133–144)
TRIGL SERPL-MCNC: 97 MG/DL

## 2018-12-07 PROCEDURE — 99396 PREV VISIT EST AGE 40-64: CPT | Mod: 25 | Performed by: FAMILY MEDICINE

## 2018-12-07 PROCEDURE — 36415 COLL VENOUS BLD VENIPUNCTURE: CPT | Performed by: FAMILY MEDICINE

## 2018-12-07 PROCEDURE — 99212 OFFICE O/P EST SF 10 MIN: CPT | Mod: 25 | Performed by: FAMILY MEDICINE

## 2018-12-07 PROCEDURE — 90471 IMMUNIZATION ADMIN: CPT | Performed by: FAMILY MEDICINE

## 2018-12-07 PROCEDURE — 80053 COMPREHEN METABOLIC PANEL: CPT | Performed by: FAMILY MEDICINE

## 2018-12-07 PROCEDURE — 87389 HIV-1 AG W/HIV-1&-2 AB AG IA: CPT | Performed by: FAMILY MEDICINE

## 2018-12-07 PROCEDURE — 90686 IIV4 VACC NO PRSV 0.5 ML IM: CPT | Performed by: FAMILY MEDICINE

## 2018-12-07 PROCEDURE — 80061 LIPID PANEL: CPT | Performed by: FAMILY MEDICINE

## 2018-12-07 RX ORDER — SIMVASTATIN 20 MG
TABLET ORAL
Qty: 90 TABLET | Refills: 2 | Status: SHIPPED | OUTPATIENT
Start: 2019-03-01 | End: 2020-01-06

## 2018-12-07 RX ORDER — ALBUTEROL SULFATE 90 UG/1
2 AEROSOL, METERED RESPIRATORY (INHALATION) EVERY 6 HOURS PRN
Qty: 1 INHALER | Refills: 0 | Status: SHIPPED | OUTPATIENT
Start: 2018-12-07

## 2018-12-07 RX ORDER — NYSTATIN AND TRIAMCINOLONE ACETONIDE 100000; 1 [USP'U]/G; MG/G
OINTMENT TOPICAL 2 TIMES DAILY
Qty: 30 G | Refills: 1 | Status: SHIPPED | OUTPATIENT
Start: 2018-12-07 | End: 2020-01-06

## 2018-12-07 RX ORDER — VALACYCLOVIR HYDROCHLORIDE 1 G/1
TABLET, FILM COATED ORAL
Qty: 4 TABLET | Refills: 11 | Status: SHIPPED | OUTPATIENT
Start: 2018-12-07 | End: 2020-01-06

## 2018-12-07 ASSESSMENT — ENCOUNTER SYMPTOMS
COUGH: 0
JOINT SWELLING: 0
HEMATOCHEZIA: 0
PALPITATIONS: 0
SHORTNESS OF BREATH: 0
CONSTIPATION: 0
DIARRHEA: 0
DYSURIA: 0
CHILLS: 0
FREQUENCY: 0
PARESTHESIAS: 0
DIZZINESS: 0
HEMATURIA: 0
WEAKNESS: 0
NERVOUS/ANXIOUS: 0
HEADACHES: 0
NAUSEA: 0
EYE PAIN: 0
ARTHRALGIAS: 0
ABDOMINAL PAIN: 0
MYALGIAS: 0
SORE THROAT: 0
FEVER: 0
HEARTBURN: 0

## 2018-12-07 NOTE — PROGRESS NOTES
SUBJECTIVE:   CC: Joaquin Dacosta is an 48 year old male who presents for preventative health visit.     Physical   Annual:     Getting at least 3 servings of Calcium per day:  Yes    Bi-annual eye exam:  Yes    Dental care twice a year:  Yes    Sleep apnea or symptoms of sleep apnea:  Excessive snoring    Diet:  Regular (no restrictions)    Frequency of exercise:  None    Taking medications regularly:  Yes    Medication side effects:  None    Additional concerns today:  Yes    PHQ-2 Total Score: 0               Medical assistant advised patient about addressing additional health concerns that could be billed, as it is not considered a part of a preventive physical. Patient verbalized understanding.    Lefty Chang MD, MD on 12/7/2018 at 8:17 AM    Derm        Duration: 1 month    Description (location/character/radiation): right thumb    Intensity:  mild, moderate     Accompanying signs and symptoms: crack skin    History (similar episodes/previous evaluation): None    Precipitating or alleviating factors: drys out, and the cold weather.    Therapies tried and outcome: neosporin , benadryl and bandage            Today's PHQ-2 Score:   PHQ-2 ( 1999 Pfizer) 12/7/2018   Q1: Little interest or pleasure in doing things 0   Q2: Feeling down, depressed or hopeless 0   PHQ-2 Score 0   Q1: Little interest or pleasure in doing things Not at all   Q2: Feeling down, depressed or hopeless Not at all   PHQ-2 Score 0       Abuse: Current or Past(Physical, Sexual or Emotional)- No  Do you feel safe in your environment? Yes    Social History   Substance Use Topics     Smoking status: Former Smoker     Smokeless tobacco: Never Used      Comment: tried in  college     Alcohol use Yes      Comment: about  1 -2 drink per day- glass of wine at dinner     Alcohol Use 12/7/2018   If you drink alcohol do you typically have greater than 3 drinks per day OR greater than 7 drinks per week? Yes   AUDIT SCORE  5     AUDIT - Alcohol  Use Disorders Identification Test - Reproduced from the World Health Organization Audit 2001 (Second Edition) 12/7/2018   1.  How often do you have a drink containing alcohol? 4 or more times a week   2.  How many drinks containing alcohol do you have on a typical day when you are drinking? 1 or 2   3.  How often do you have five or more drinks on one occasion? Less than monthly   4.  How often during the last year have you found that you were not able to stop drinking once you had started? Never   5.  How often during the last year have you failed to do what was normally expected of you because of drinking? Never   6.  How often during the last year have you needed a first drink in the morning to get yourself going after a heavy drinking session? Never   7.  How often during the last year have you had a feeling of guilt or remorse after drinking? Never   8.  How often during the last year have you been unable to remember what happened the night before because of your drinking? Never   9.  Have you or someone else been injured because of your drinking? No   10. Has a relative, friend, doctor or other health care worker been concerned about your drinking or suggested you cut down? No   TOTAL SCORE 5       Last PSA: No results found for: PSA    Reviewed orders with patient. Reviewed health maintenance and updated orders accordingly - Yes  Labs reviewed in EPIC    Reviewed and updated as needed this visit by clinical staff  Tobacco  Allergies  Meds  Med Hx  Surg Hx  Fam Hx  Soc Hx      Reviewed and updated as needed this visit by Provider    Right handed. Not improving. Small crack and now it is spreading.            Review of Systems   Constitutional: Negative for chills and fever.   HENT: Negative for congestion, ear pain, hearing loss and sore throat.    Eyes: Negative for pain and visual disturbance.   Respiratory: Negative for cough and shortness of breath.    Cardiovascular: Negative for chest pain,  "palpitations and peripheral edema.   Gastrointestinal: Negative for abdominal pain, constipation, diarrhea, heartburn, hematochezia and nausea.   Genitourinary: Negative for discharge, dysuria, frequency, genital sores, hematuria, impotence and urgency.   Musculoskeletal: Negative for arthralgias, joint swelling and myalgias.   Skin: Negative for rash.   Neurological: Negative for dizziness, weakness, headaches and paresthesias.   Psychiatric/Behavioral: Negative for mood changes. The patient is not nervous/anxious.      OBJECTIVE:   /82 (BP Location: Left arm, Patient Position: Chair, Cuff Size: Adult Large)  Pulse 63  Temp 97.6  F (36.4  C) (Oral)  Resp 15  Ht 5' 8\" (1.727 m)  Wt 179 lb 4 oz (81.3 kg)  SpO2 97%  BMI 27.25 kg/m2    Physical Exam  GENERAL: healthy, alert and no distress  EYES: Eyes grossly normal to inspection, PERRL and conjunctivae and sclerae normal  HENT: ear canals and TM's normal, nose and mouth without ulcers or lesions  NECK: no adenopathy, no asymmetry, masses, or scars and thyroid normal to palpation  RESP: lungs clear to auscultation - no rales, rhonchi or wheezes  CV: regular rate and rhythm, normal S1 S2, no S3 or S4, no murmur, click or rub, no peripheral edema and peripheral pulses strong  ABDOMEN: soft, nontender, no hepatosplenomegaly, no masses and bowel sounds normal   (male): normal male genitalia without lesions or urethral discharge, no hernia, right testicle smaller than left.   MS: no gross musculoskeletal defects noted, no edema  SKIN:right thumb on dorsum distal interphalangeal joint - erythematous patch with some skin break down.   NEURO: Normal strength and tone, mentation intact and speech normal  PSYCH: mentation appears normal, affect normal/bright    ASSESSMENT/PLAN:   1. Routine general medical examination at a health care facility       2. Eczema, unspecified type  With concern of some underlying fungal infection. Trial of steroid+antifungal combo " "cream. If not improving in 2-3 weeks - call for derm referral. Avoid irritant.   - nystatin-triamcinolone (MYCOLOG) 592628-1.1 UNIT/GM-% external ointment; Apply topically 2 times daily For 3 weeks.  Dispense: 30 g; Refill: 1    3. Mild intermittent asthma without complication     - albuterol (PROAIR HFA/PROVENTIL HFA/VENTOLIN HFA) 108 (90 Base) MCG/ACT inhaler; Inhale 2 puffs into the lungs every 6 hours as needed for shortness of breath / dyspnea or wheezing  Dispense: 1 Inhaler; Refill: 0    4. Hyperlipidemia LDL goal <130     - Lipid panel reflex to direct LDL Fasting  - Comprehensive metabolic panel  - simvastatin (ZOCOR) 20 MG tablet; TAKE 1 TABLET BY MOUTH NIGHTLY AT BEDTIME.  Dispense: 90 tablet; Refill: 2    5. Recurrent herpes labialis     - valACYclovir (VALTREX) 1000 mg tablet; Take 2 tablets twice per day at the onset of cold sore.  Dispense: 4 tablet; Refill: 11    6. Screening for HIV without presence of risk factors     - HIV Antigen Antibody Combo    7. Need for prophylactic vaccination and inoculation against influenza     - FLU VACCINE, SPLIT VIRUS, IM (QUADRIVALENT) [51166]- >3 YRS  - Vaccine Administration, Initial [89593]    COUNSELING:   Reviewed preventive health counseling, as reflected in patient instructions  Special attention given to:        Regular exercise       Healthy diet/nutrition       Vision screening       Hearing screening       Colon cancer screening    BP Readings from Last 1 Encounters:   12/07/18 118/82     Estimated body mass index is 27.25 kg/(m^2) as calculated from the following:    Height as of this encounter: 5' 8\" (1.727 m).    Weight as of this encounter: 179 lb 4 oz (81.3 kg).      Weight management plan: Discussed healthy diet and exercise guidelines     reports that he has quit smoking. He has never used smokeless tobacco.      Counseling Resources:  ATP IV Guidelines  Pooled Cohorts Equation Calculator  FRAX Risk Assessment  ICSI Preventive Guidelines  Dietary " Guidelines for Americans, 2010  USDA's MyPlate  ASA Prophylaxis  Lung CA Screening    Lefty Chang MD, MD  [facility]      Injectable Influenza Immunization Documentation    1.  Is the person to be vaccinated sick today?   No    2. Does the person to be vaccinated have an allergy to a component   of the vaccine?   No  Egg Allergy Algorithm Link    3. Has the person to be vaccinated ever had a serious reaction   to influenza vaccine in the past?   No    4. Has the person to be vaccinated ever had Guillain-Barré syndrome?   No    Form completed by Tracy Moncada MA

## 2018-12-07 NOTE — PROGRESS NOTES

## 2018-12-07 NOTE — MR AVS SNAPSHOT
After Visit Summary   12/7/2018    Joaquin Dacosta    MRN: [id number]           Patient Information     Date Of Birth          1970        Visit Information        Provider Department      12/7/2018 8:00 AM Lefty Chang MD [facility]        Today's Diagnoses     Routine general medical examination at a health care facility    -  1    Eczema, unspecified type        Mild intermittent asthma without complication        Hyperlipidemia LDL goal <130        Recurrent herpes labialis          Care Instructions      Preventive Health Recommendations  Male Ages 40 to 49    Yearly exam:             See your health care provider every year in order to  o   Review health changes.   o   Discuss preventive care.    o   Review your medicines if your doctor has prescribed any.    You should be tested each year for STDs (sexually transmitted diseases) if you re at risk.     Have a cholesterol test every 5 years.     Have a colonoscopy (test for colon cancer) if someone in your family has had colon cancer or polyps before age 50.     After age 45, have a diabetes test (fasting glucose). If you are at risk for diabetes, you should have this test every 3 years.      Talk with your health care provider about whether or not a prostate cancer screening test (PSA) is right for you.    Shots: Get a flu shot each year. Get a tetanus shot every 10 years.     Nutrition:    Eat at least 5 servings of fruits and vegetables daily.     Eat whole-grain bread, whole-wheat pasta and brown rice instead of white grains and rice.     Get adequate Calcium and Vitamin D.     Lifestyle    Exercise for at least 150 minutes a week (30 minutes a day, 5 days a week). This will help you control your weight and prevent disease.     Limit alcohol to one drink per day.     No smoking.     Wear sunscreen to prevent skin cancer.     See your dentist every six months for an exam and cleaning.              Follow-ups  "after your visit        Who to contact     If you have questions or need follow up information about today's clinic visit or your schedule please contact [facility] directly at [phone].  Normal or non-critical lab and imaging results will be communicated to you by MyChart, letter or phone within 4 business days after the clinic has received the results. If you do not hear from us within 7 days, please contact the clinic through MyChart or phone. If you have a critical or abnormal lab result, we will notify you by phone as soon as possible.  Submit refill requests through [employer] or call your pharmacy and they will forward the refill request to us. Please allow 3 business days for your refill to be completed.          Additional Information About Your Visit        [facility] Information     [employer] gives you secure access to your electronic health record. If you see a primary care provider, you can also send messages to your care team and make appointments. If you have questions, please call your primary care clinic.  If you do not have a primary care provider, please call [phone] and they will assist you.        Care EveryWhere ID     This is your Care EveryWhere ID. This could be used by other organizations to access your [facility] medical records  [id number]        Your Vitals Were     Pulse Temperature Respirations Height Pulse Oximetry BMI (Body Mass Index)    63 97.6  F (36.4  C) (Oral) 15 5' 8\" (1.727 m) 97% 27.25 kg/m2       Blood Pressure from Last 3 Encounters:   12/07/18 118/82   06/06/18 141/89   12/13/17 123/82    Weight from Last 3 Encounters:   12/07/18 179 lb 4 oz (81.3 kg)   06/06/18 174 lb (78.9 kg)   12/13/17 181 lb (82.1 kg)              We Performed the Following     Comprehensive metabolic panel     Lipid panel reflex to direct LDL Fasting     PAF COMPLETED          Today's Medication Changes          These changes are accurate as of 12/7/18  8:44 AM.  If you have any " questions, ask your nurse or doctor.               Start taking these medicines.        Dose/Directions    nystatin-triamcinolone 851925-0.1 UNIT/GM-% external ointment   Commonly known as:  MYCOLOG   Used for:  Eczema, unspecified type   Started by:  Lefty Chang MD        Apply topically 2 times daily For 3 weeks.   Quantity:  30 g   Refills:  1         These medicines have changed or have updated prescriptions.        Dose/Directions    simvastatin 20 MG tablet   Commonly known as:  ZOCOR   This may have changed:  These instructions start on 3/1/2019. If you are unsure what to do until then, ask your doctor or other care provider.   Used for:  Hyperlipidemia LDL goal <130   Changed by:  Lefty Chang MD        Start taking on:  3/1/2019   TAKE 1 TABLET BY MOUTH NIGHTLY AT BEDTIME.   Quantity:  90 tablet   Refills:  2            Where to get your medicines      These medications were sent to [place] [address] [place] [address] [address]     Phone:  [phone]     albuterol 108 (90 Base) MCG/ACT inhaler    nystatin-triamcinolone 424979-6.1 UNIT/GM-% external ointment    simvastatin 20 MG tablet    valACYclovir 1000 mg tablet                Primary Care Provider Office Phone # Fax #    Lefty Chang [phone] [address] [place] [id number]        Equal Access to Services     [facility]: Hadii christian geller hadasho Solori, waaxda luqadaha, qaybta kaalmada adeegyada, jen echevarria. So [facility] [phone].    ATENCIÓN: Si habla español, tiene a hodges disposición servicios gratuitos de asistencia lingüística. Llame al [phone].    We comply with applicable federal civil rights laws and [place] laws. We do not discriminate on the basis of race, color, national origin, age, disability, sex, sexual orientation, or gender identity.            Thank you!     Thank you for  choosing [facility]  for your care. Our goal is always to provide you with excellent care. Hearing back from our patients is one way we can continue to improve our services. Please take a few minutes to complete the written survey that you may receive in the mail after your visit with us. Thank you!             Your Updated Medication List - Protect others around you: Learn how to safely use, store and throw away your medicines at www.disposemymeds.org.          This list is accurate as of 12/7/18  8:44 AM.  Always use your most recent med list.                   Brand Name Dispense Instructions for use Diagnosis    albuterol 108 (90 Base) MCG/ACT inhaler    PROAIR HFA/PROVENTIL HFA/VENTOLIN HFA    1 Inhaler    Inhale 2 puffs into the lungs every 6 hours as needed for shortness of breath / dyspnea or wheezing    Mild intermittent asthma without complication       ALLEGRA PO      Take  by mouth.    Allergic rhinitis, cause unspecified       ASPIRIN PO      Take 81 mg by mouth daily        melatonin 3 MG Caps      Take 5 mg by mouth        Multi-vitamin tablet   Generic drug:  multivitamin w/minerals     0    1 tab QD        nystatin-triamcinolone 891139-7.1 UNIT/GM-% external ointment    MYCOLOG    30 g    Apply topically 2 times daily For 3 weeks.    Eczema, unspecified type       order for DME     1 Device    Equipment being ordered: tie up ankle splint    Sprain of right ankle, unspecified ligament, initial encounter, Ankle injury, right, initial encounter       simvastatin 20 MG tablet   Start taking on:  3/1/2019    ZOCOR    90 tablet    TAKE 1 TABLET BY MOUTH NIGHTLY AT BEDTIME.    Hyperlipidemia LDL goal <130       UNABLE TO FIND      MEDICATION NAME: digestive advantage, OTC        valACYclovir 1000 mg tablet    VALTREX    4 tablet    Take 2 tablets twice per day at the onset of cold sore.    Recurrent herpes labialis

## 2018-12-08 ASSESSMENT — ASTHMA QUESTIONNAIRES: ACT_TOTALSCORE: 23

## 2019-10-04 ENCOUNTER — HEALTH MAINTENANCE LETTER (OUTPATIENT)
Age: 49
End: 2019-10-04

## 2020-01-06 ENCOUNTER — OFFICE VISIT (OUTPATIENT)
Dept: FAMILY MEDICINE | Facility: CLINIC | Age: 50
End: 2020-01-06
Payer: COMMERCIAL

## 2020-01-06 VITALS
BODY MASS INDEX: 27.43 KG/M2 | TEMPERATURE: 98.1 F | SYSTOLIC BLOOD PRESSURE: 130 MMHG | HEIGHT: 68 IN | DIASTOLIC BLOOD PRESSURE: 82 MMHG | WEIGHT: 181 LBS | OXYGEN SATURATION: 98 % | RESPIRATION RATE: 16 BRPM | HEART RATE: 67 BPM

## 2020-01-06 DIAGNOSIS — R06.83 SNORING: ICD-10-CM

## 2020-01-06 DIAGNOSIS — B00.1 RECURRENT HERPES LABIALIS: ICD-10-CM

## 2020-01-06 DIAGNOSIS — E78.5 HYPERLIPIDEMIA LDL GOAL <130: Primary | ICD-10-CM

## 2020-01-06 DIAGNOSIS — Z12.11 SCREEN FOR COLON CANCER: ICD-10-CM

## 2020-01-06 DIAGNOSIS — E78.5 HYPERLIPIDEMIA LDL GOAL <130: ICD-10-CM

## 2020-01-06 DIAGNOSIS — Z00.00 ROUTINE GENERAL MEDICAL EXAMINATION AT A HEALTH CARE FACILITY: Primary | ICD-10-CM

## 2020-01-06 DIAGNOSIS — Z12.5 SCREENING PSA (PROSTATE SPECIFIC ANTIGEN): ICD-10-CM

## 2020-01-06 LAB
ALBUMIN SERPL-MCNC: 4 G/DL (ref 3.4–5)
ALP SERPL-CCNC: 67 U/L (ref 40–150)
ALT SERPL W P-5'-P-CCNC: 60 U/L (ref 0–70)
ANION GAP SERPL CALCULATED.3IONS-SCNC: 7 MMOL/L (ref 3–14)
AST SERPL W P-5'-P-CCNC: 21 U/L (ref 0–45)
BILIRUB SERPL-MCNC: 0.5 MG/DL (ref 0.2–1.3)
BUN SERPL-MCNC: 14 MG/DL (ref 7–30)
CALCIUM SERPL-MCNC: 9.3 MG/DL (ref 8.5–10.1)
CHLORIDE SERPL-SCNC: 106 MMOL/L (ref 94–109)
CHOLEST SERPL-MCNC: 327 MG/DL
CO2 SERPL-SCNC: 25 MMOL/L (ref 20–32)
CREAT SERPL-MCNC: 0.98 MG/DL (ref 0.66–1.25)
GFR SERPL CREATININE-BSD FRML MDRD: 89 ML/MIN/{1.73_M2}
GLUCOSE SERPL-MCNC: 106 MG/DL (ref 70–99)
HDLC SERPL-MCNC: 75 MG/DL
LDLC SERPL CALC-MCNC: 228 MG/DL
NONHDLC SERPL-MCNC: 252 MG/DL
POTASSIUM SERPL-SCNC: 4.1 MMOL/L (ref 3.4–5.3)
PROT SERPL-MCNC: 7.5 G/DL (ref 6.8–8.8)
PSA SERPL-ACNC: 1.78 UG/L (ref 0–4)
SODIUM SERPL-SCNC: 138 MMOL/L (ref 133–144)
TRIGL SERPL-MCNC: 121 MG/DL

## 2020-01-06 PROCEDURE — G0103 PSA SCREENING: HCPCS | Performed by: FAMILY MEDICINE

## 2020-01-06 PROCEDURE — 90682 RIV4 VACC RECOMBINANT DNA IM: CPT | Performed by: FAMILY MEDICINE

## 2020-01-06 PROCEDURE — 99396 PREV VISIT EST AGE 40-64: CPT | Mod: 25 | Performed by: FAMILY MEDICINE

## 2020-01-06 PROCEDURE — 80053 COMPREHEN METABOLIC PANEL: CPT | Performed by: FAMILY MEDICINE

## 2020-01-06 PROCEDURE — 36415 COLL VENOUS BLD VENIPUNCTURE: CPT | Performed by: FAMILY MEDICINE

## 2020-01-06 PROCEDURE — 90471 IMMUNIZATION ADMIN: CPT | Performed by: FAMILY MEDICINE

## 2020-01-06 PROCEDURE — 80061 LIPID PANEL: CPT | Performed by: FAMILY MEDICINE

## 2020-01-06 RX ORDER — ATORVASTATIN CALCIUM 40 MG/1
40 TABLET, FILM COATED ORAL DAILY
Qty: 90 TABLET | Refills: 3 | Status: SHIPPED | OUTPATIENT
Start: 2020-01-06 | End: 2021-01-05

## 2020-01-06 RX ORDER — VALACYCLOVIR HYDROCHLORIDE 1 G/1
TABLET, FILM COATED ORAL
Qty: 4 TABLET | Refills: 11 | Status: SHIPPED | OUTPATIENT
Start: 2020-01-06 | End: 2021-02-08

## 2020-01-06 RX ORDER — SIMVASTATIN 20 MG
TABLET ORAL
Qty: 90 TABLET | Refills: 3 | Status: SHIPPED | OUTPATIENT
Start: 2020-01-06 | End: 2020-01-06

## 2020-01-06 ASSESSMENT — ENCOUNTER SYMPTOMS
HEMATOCHEZIA: 0
JOINT SWELLING: 0
ABDOMINAL PAIN: 0
MYALGIAS: 0
PARESTHESIAS: 1
FREQUENCY: 0
COUGH: 0
DIZZINESS: 0
DIARRHEA: 0
HEMATURIA: 0
HEARTBURN: 0
CONSTIPATION: 0
HEADACHES: 0
SORE THROAT: 0
SHORTNESS OF BREATH: 0
FEVER: 0
DYSURIA: 0
ARTHRALGIAS: 0
PALPITATIONS: 0
WEAKNESS: 0
EYE PAIN: 0
CHILLS: 0
NERVOUS/ANXIOUS: 0
NAUSEA: 0

## 2020-01-06 ASSESSMENT — ANXIETY QUESTIONNAIRES
GAD7 TOTAL SCORE: 0
5. BEING SO RESTLESS THAT IT IS HARD TO SIT STILL: NOT AT ALL
GAD7 TOTAL SCORE: 0
7. FEELING AFRAID AS IF SOMETHING AWFUL MIGHT HAPPEN: NOT AT ALL
1. FEELING NERVOUS, ANXIOUS, OR ON EDGE: NOT AT ALL
2. NOT BEING ABLE TO STOP OR CONTROL WORRYING: NOT AT ALL
6. BECOMING EASILY ANNOYED OR IRRITABLE: NOT AT ALL
3. WORRYING TOO MUCH ABOUT DIFFERENT THINGS: NOT AT ALL
GAD7 TOTAL SCORE: 0
4. TROUBLE RELAXING: NOT AT ALL
7. FEELING AFRAID AS IF SOMETHING AWFUL MIGHT HAPPEN: NOT AT ALL

## 2020-01-06 ASSESSMENT — PATIENT HEALTH QUESTIONNAIRE - PHQ9
10. IF YOU CHECKED OFF ANY PROBLEMS, HOW DIFFICULT HAVE THESE PROBLEMS MADE IT FOR YOU TO DO YOUR WORK, TAKE CARE OF THINGS AT HOME, OR GET ALONG WITH OTHER PEOPLE: NOT DIFFICULT AT ALL
SUM OF ALL RESPONSES TO PHQ QUESTIONS 1-9: 2
SUM OF ALL RESPONSES TO PHQ QUESTIONS 1-9: 2

## 2020-01-06 ASSESSMENT — MIFFLIN-ST. JEOR: SCORE: 1659.48

## 2020-01-06 NOTE — PROGRESS NOTES
"SUBJECTIVE:   CC: Joaquin Dacosta is an 49 year old male who presents for preventative health visit.     HPI  {Add if <65 person on Medicare  - Required Questions (Optional):730087}  {Outside tests to abstract? :393531}    {additional problems to add (Optional):394345}    Today's PHQ-2 Score:   PHQ-2 ( 1999 Pfizer) 1/6/2020   Q1: Little interest or pleasure in doing things -   Q2: Feeling down, depressed or hopeless -   PHQ-2 Score -   Q1: Little interest or pleasure in doing things -   Q2: Feeling down, depressed or hopeless -   PHQ-2 Score Incomplete       Abuse: Current or Past(Physical, Sexual or Emotional)- { :698121}  Do you feel safe in your environment? { :539635}        Social History     Tobacco Use     Smoking status: Former Smoker     Smokeless tobacco: Never Used     Tobacco comment: tried in  college   Substance Use Topics     Alcohol use: Yes     Comment: about  1 -2 drink per day- glass of wine at dinner     {Rooming Staff- Complete this question if Prescreen response is not shown below for today's visit. If you drink alcohol do you typically have >3 drinks per day or >7 drinks per week? (Optional):442243}    Alcohol Use 12/7/2018   Prescreen: >3 drinks/day or >7 drinks/week? Yes   Prescreen: >3 drinks/day or >7 drinks/week? -   AUDIT SCORE  5   {add AUDIT responses (Optional) (A score of 7 for adult men is an indication of hazardous drinking; a score of 8 or more is an indication of an alcohol use disorder.  A score of 7 or more for adult women is an indication of hazardous drinking or an alchohol use disorder):156235}    Last PSA: No results found for: PSA    Reviewed orders with patient. Reviewed health maintenance and updated orders accordingly - { :696202::\"Yes\"}  {Chronicprobdata (optional):530483}    Reviewed and updated as needed this visit by clinical staff         Reviewed and updated as needed this visit by Provider        {HISTORY OPTIONS (Optional):029275}    Review of Systems  {MALE ROS " "(Optional):275979::\"CONSTITUTIONAL: NEGATIVE for fever, chills, change in weight\",\"INTEGUMENTARY/SKIN: NEGATIVE for worrisome rashes, moles or lesions\",\"EYES: NEGATIVE for vision changes or irritation\",\"ENT: NEGATIVE for ear, mouth and throat problems\",\"RESP: NEGATIVE for significant cough or SOB\",\"CV: NEGATIVE for chest pain, palpitations or peripheral edema\",\"GI: NEGATIVE for nausea, abdominal pain, heartburn, or change in bowel habits\",\" male: negative for dysuria, hematuria, decreased urinary stream, erectile dysfunction, urethral discharge\",\"MUSCULOSKELETAL: NEGATIVE for significant arthralgias or myalgia\",\"NEURO: NEGATIVE for weakness, dizziness or paresthesias\",\"PSYCHIATRIC: NEGATIVE for changes in mood or affect\"}    OBJECTIVE:   There were no vitals taken for this visit.    Physical Exam  {Exam Choices (Optional):559418}    {Diagnostic Test Results (Optional):797430::\"Diagnostic Test Results:\",\"Labs reviewed in Epic\"}    ASSESSMENT/PLAN:   {Diag Picklist:492609}    COUNSELING:   {MALE COUNSELING MESSAGES:374100::\"Reviewed preventive health counseling, as reflected in patient instructions\"}    Estimated body mass index is 27.25 kg/m  as calculated from the following:    Height as of 12/7/18: 1.727 m (5' 8\").    Weight as of 12/7/18: 81.3 kg (179 lb 4 oz).     {Weight Management Plan (ACO) Complete if BMI is abnormal-  Ages 18-64  BMI >24.9.  Age 65+ with BMI <23 or >30 (Optional):214417}     reports that he has quit smoking. He has never used smokeless tobacco.  {Tobacco Cessation -- Complete if patient is a smoker (Optional):094189}    Counseling Resources:  ATP IV Guidelines  Pooled Cohorts Equation Calculator  FRAX Risk Assessment  ICSI Preventive Guidelines  Dietary Guidelines for Americans, 2010  USDA's MyPlate  ASA Prophylaxis  Lung CA Screening    Lefty Chang MD, MD  [facility]  Answers for HPI/ROS submitted by the patient on 1/6/2020   If you checked off any problems, " how difficult have these problems made it for you to do your work, take care of things at home, or get along with other people?: Not difficult at all  PHQ9 TOTAL SCORE: 2  FAISAL 7 TOTAL SCORE: 0

## 2020-01-06 NOTE — PATIENT INSTRUCTIONS
Preventive Health Recommendations  Male Ages 50 - 64    Yearly exam:             See your health care provider every year in order to  o   Review health changes.   o   Discuss preventive care.    o   Review your medicines if your doctor has prescribed any.     Have a cholesterol test every 5 years, or more frequently if you are at risk for high cholesterol/heart disease.     Have a diabetes test (fasting glucose) every three years. If you are at risk for diabetes, you should have this test more often.     Have a colonoscopy at age 50, or have a yearly FIT test (stool test). These exams will check for colon cancer.      Talk with your health care provider about whether or not a prostate cancer screening test (PSA) is right for you.    You should be tested each year for STDs (sexually transmitted diseases), if you re at risk.     Shots: Get a flu shot each year. Get a tetanus shot every 10 years.     Nutrition:    Eat at least 5 servings of fruits and vegetables daily.     Eat whole-grain bread, whole-wheat pasta and brown rice instead of white grains and rice.     Get adequate Calcium and Vitamin D.     Lifestyle    Exercise for at least 150 minutes a week (30 minutes a day, 5 days a week). This will help you control your weight and prevent disease.     Limit alcohol to one drink per day.     No smoking.     Wear sunscreen to prevent skin cancer.     See your dentist every six months for an exam and cleaning.     See your eye doctor every 1 to 2 years.    Preventive Health Recommendations  Male Ages 50 - 64    Yearly exam:             See your health care provider every year in order to  o   Review health changes.   o   Discuss preventive care.    o   Review your medicines if your doctor has prescribed any.     Have a cholesterol test every 5 years, or more frequently if you are at risk for high cholesterol/heart disease.     Have a diabetes test (fasting glucose) every three years. If you are at risk for diabetes,  you should have this test more often.     Have a colonoscopy at age 50, or have a yearly FIT test (stool test). These exams will check for colon cancer.      Talk with your health care provider about whether or not a prostate cancer screening test (PSA) is right for you.    You should be tested each year for STDs (sexually transmitted diseases), if you re at risk.     Shots: Get a flu shot each year. Get a tetanus shot every 10 years.     Nutrition:    Eat at least 5 servings of fruits and vegetables daily.     Eat whole-grain bread, whole-wheat pasta and brown rice instead of white grains and rice.     Get adequate Calcium and Vitamin D.     Lifestyle    Exercise for at least 150 minutes a week (30 minutes a day, 5 days a week). This will help you control your weight and prevent disease.     Limit alcohol to one drink per day.     No smoking.     Wear sunscreen to prevent skin cancer.     See your dentist every six months for an exam and cleaning.     See your eye doctor every 1 to 2 years.    Please call [phone] Or [phone] to schedule colonoscopy.

## 2020-01-06 NOTE — PROGRESS NOTES
3  SUBJECTIVE:   CC: Joaquin Dacosta is an 49 year old male who presents for preventive health visit.      Healthy Habits:    Do you get at least three servings of calcium containing foods daily (dairy, green leafy vegetables, etc.)? yes    Amount of exercise or daily activities, outside of work: none    Problems taking medications regularly No    Medication side effects: Yes sore muscles     Have you had an eye exam in the past two years? yes    Do you see a dentist twice per year? yes    Do you have sleep apnea, excessive snoring or daytime drowsiness?snorring        Today's PHQ-2 Score:   PHQ-2 ( 1999 Pfizer) 1/6/2020 12/7/2018   Q1: Little interest or pleasure in doing things - 0   Q2: Feeling down, depressed or hopeless - 0   PHQ-2 Score - 0   Q1: Little interest or pleasure in doing things - Not at all   Q2: Feeling down, depressed or hopeless - Not at all   PHQ-2 Score Incomplete 0     Abuse: Current or Past(Physical, Sexual or Emotional)- No  Do you feel safe in your environment? Yes    Social History     Tobacco Use     Smoking status: Former Smoker     Smokeless tobacco: Never Used     Tobacco comment: tried in  [place]   Substance Use Topics     Alcohol use: Yes     Comment: about  1 -2 drink per day- glass of wine at dinner     If you drink alcohol do you typically have >3 drinks per day or >7 drinks per week? No                      Last PSA: No results found for: PSA    Reviewed orders with patient. Reviewed health maintenance and updated orders accordingly - Yes     Reviewed and updated as needed this visit by clinical staff    Reviewed and updated as needed this visit by Provider    Snoring - getting worse. Wife is waking up from his snoring. No stop breathing as per wife.     ROS:  CONSTITUTIONAL: NEGATIVE for fever, chills, change in weight  INTEGUMENTARY/SKIN: NEGATIVE for worrisome rashes, moles or lesions  EYES: NEGATIVE for vision changes or irritation  ENT: NEGATIVE for ear, mouth and throat  "problems  RESP: NEGATIVE for significant cough or SOB  CV: NEGATIVE for chest pain, palpitations or peripheral edema  GI: NEGATIVE for nausea, abdominal pain, heartburn, or change in bowel habits   male: negative for dysuria, hematuria, decreased urinary stream, erectile dysfunction, urethral discharge  MUSCULOSKELETAL: NEGATIVE for significant arthralgias or myalgia  NEURO: NEGATIVE for weakness, dizziness or paresthesias  ENDOCRINE: NEGATIVE for temperature intolerance, skin/hair changes  PSYCHIATRIC: NEGATIVE for changes in mood or affect    OBJECTIVE:   /82 (BP Location: Right arm, Patient Position: Sitting, Cuff Size: Adult Regular)   Pulse 67   Temp 98.1  F (36.7  C) (Oral)   Resp 16   Ht 1.734 m (5' 8.25\")   Wt 82.1 kg (181 lb)   SpO2 98%   BMI 27.32 kg/m    EXAM:  GENERAL: healthy, alert and no distress  EYES: Eyes grossly normal to inspection, PERRL and conjunctivae and sclerae normal  HENT: ear canals and TM's normal, nose and mouth without ulcers or lesions  NECK: no adenopathy, no asymmetry, masses, or scars and thyroid normal to palpation  RESP: lungs clear to auscultation - no rales, rhonchi or wheezes  CV: regular rate and rhythm, normal S1 S2, no S3 or S4, no murmur, click or rub, no peripheral edema and peripheral pulses strong  ABDOMEN: soft, nontender, no hepatosplenomegaly, no masses and bowel sounds normal   (male): normal male genitalia without lesions or urethral discharge, no hernia  MS: no gross musculoskeletal defects noted, no edema  SKIN: no suspicious lesions or rashes  NEURO: Normal strength and tone, mentation intact and speech normal  PSYCH: mentation appears normal, affect normal/bright          ASSESSMENT/PLAN:   1. Routine general medical examination at a health care facility  Consider Shingrix.  Discussed with him.    2. Hyperlipidemia LDL goal <130  Out of medication for last few weeks.  Suspect mild worsening of his lipid panel.  - simvastatin (ZOCOR) 20 MG " "tablet; TAKE 1 TABLET BY MOUTH NIGHTLY AT BEDTIME.  Dispense: 90 tablet; Refill: 3  - Comprehensive metabolic panel  - Lipid panel reflex to direct LDL Fasting    3. Recurrent herpes labialis     - valACYclovir (VALTREX) 1000 mg tablet; Take 2 tablets twice per day at the onset of cold sore.  Dispense: 4 tablet; Refill: 11  - Comprehensive metabolic panel    4. Screening PSA (prostate specific antigen)     - PSA, screen    5. Screen for colon cancer     - GASTROENTEROLOGY ADULT REF PROCEDURE ONLY    6. Snoring.   Worsening of symptoms.  Sleep apnea cannot be ruled out.  - SLEEP EVALUATION & MANAGEMENT REFERRAL - ADULT -[facility] [phone]  (Age 18 and up); Future    COUNSELING:  Reviewed preventive health counseling, as reflected in patient instructions  Special attention given to:        Regular exercise       Healthy diet/nutrition       Vision screening       Hearing screening       Colon cancer screening       Prostate cancer screening    Estimated body mass index is 27.25 kg/m  as calculated from the following:    Height as of 12/7/18: 1.727 m (5' 8\").    Weight as of 12/7/18: 81.3 kg (179 lb 4 oz).    Weight management plan: Discussed healthy diet and exercise guidelines     reports that he has quit smoking. He has never used smokeless tobacco.    Answers for HPI/ROS submitted by the patient on 1/6/2020   If you checked off any problems, how difficult have these problems made it for you to do your work, take care of things at home, or get along with other people?: Not difficult at all  PHQ9 TOTAL SCORE: 2  FAISAL 7 TOTAL SCORE: 0    Counseling Resources:  ATP IV Guidelines  Pooled Cohorts Equation Calculator  FRAX Risk Assessment  ICSI Preventive Guidelines  Dietary Guidelines for Americans, 2010  USDA's MyPlate  ASA Prophylaxis  Lung CA Screening    Lefty Chang MD, MD  [facility]  "

## 2020-01-07 ASSESSMENT — ANXIETY QUESTIONNAIRES: GAD7 TOTAL SCORE: 0

## 2020-02-21 NOTE — PROGRESS NOTES
RN attempted to reach parent again. No answer. RN will send [employer] message.    SUBJECTIVE:   CC: Joaquin Dacosta is an 47 year old male who presents for preventative health visit.     Physical   Annual:     Getting at least 3 servings of Calcium per day::  Yes    Bi-annual eye exam::  Yes    Dental care twice a year::  Yes    Sleep apnea or symptoms of sleep apnea::  Daytime drowsiness and Excessive snoring    Diet::  Vegetarian/vegan    Taking medications regularly::  Yes    Medication side effects::  None    Additional concerns today::  No          Today's PHQ-2 Score:   PHQ-2 ( 1999 Pfizer) 12/13/2017   Q1: Little interest or pleasure in doing things 0   Q2: Feeling down, depressed or hopeless 0   PHQ-2 Score 0   Q1: Little interest or pleasure in doing things Not at all   Q2: Feeling down, depressed or hopeless Not at all   PHQ-2 Score 0     Abuse: Current or Past(Physical, Sexual or Emotional)- No  Do you feel safe in your environment - Yes    Social History   Substance Use Topics     Smoking status: Former Smoker     Smokeless tobacco: Never Used      Comment: tried in  college     Alcohol use Yes      Comment: about  1 -2 drink per day- glass of wine at dinner     The patient does not drink >3 drinks per day nor >7 drinks per week.    Last PSA: No results found for: PSA    Reviewed orders with patient. Reviewed health maintenance and updated orders accordingly - Yes  Labs reviewed in EPIC    Reviewed and updated as needed this visit by clinical staff  Tobacco  Allergies  Meds  Med Hx  Surg Hx  Fam Hx  Soc Hx      Reviewed and updated as needed this visit by Provider    Has around 6 yo old maxair prescription. Would like to get new one. Needing very infrequently.    Regular albuterol gives him palpitation.     Valtrex takes as needed.     Review of Systems  C: NEGATIVE for fever, chills, change in weight  I: NEGATIVE for worrisome rashes, moles or lesions  E: NEGATIVE for vision changes or irritation  ENT: NEGATIVE for ear, mouth and throat problems  R: NEGATIVE for significant  "cough or SOB  CV: NEGATIVE for chest pain, palpitations or peripheral edema  GI: NEGATIVE for nausea, abdominal pain, heartburn, or change in bowel habits   male: negative for dysuria, hematuria, decreased urinary stream, erectile dysfunction, urethral discharge  M: NEGATIVE for significant arthralgias or myalgia  N: NEGATIVE for weakness, dizziness or paresthesias  E: NEGATIVE for temperature intolerance, skin/hair changes  P: NEGATIVE for changes in mood or affect    OBJECTIVE:   /82  Pulse 64  Temp 98.1  F (36.7  C) (Oral)  Resp 12  Ht 5' 8\" (1.727 m)  Wt 181 lb (82.1 kg)  SpO2 97%  BMI 27.52 kg/m2    Physical Exam  GENERAL: healthy, alert and no distress  EYES: Eyes grossly normal to inspection, PERRL and conjunctivae and sclerae normal  HENT: ear canals and TM's normal, nose and mouth without ulcers or lesions  NECK: no adenopathy, no asymmetry, masses, or scars and thyroid normal to palpation  RESP: lungs clear to auscultation - no rales, rhonchi or wheezes  CV: regular rate and rhythm, normal S1 S2, no S3 or S4, no murmur, click or rub, no peripheral edema and peripheral pulses strong  ABDOMEN: soft, nontender, no hepatosplenomegaly, no masses and bowel sounds normal   (male): normal male genitalia without lesions or urethral discharge, no hernia  MS: no gross musculoskeletal defects noted, no edema  SKIN: no suspicious lesions or rashes  NEURO: Normal strength and tone, mentation intact and speech normal  PSYCH: mentation appears normal, affect normal/bright    ASSESSMENT/PLAN:   1. Routine general medical examination at a health care facility       2. Need for prophylactic vaccination and inoculation against influenza     - FLU VAC, SPLIT VIRUS IM > 3 YO (QUADRIVALENT) [55846]  - Vaccine Administration, Initial [67440]    3. Mild intermittent asthma without complication  Albuterol cause tachycardia in the past.  He has been using MAXAIR very infrequently.  His last inhaler was refilled 2012 " "and he still has it.Discussed Specific brand names may not be covered by insurance and he is understands but wishes to try.   - pirbuterol (MAXAIR) 200 MCG/INH Inhaler; Inhale 2 puffs into the lungs 4 times daily  Dispense: 1 Inhaler; Refill: 0    4. Recurrent herpes labialis   Patient is tolerating current medication without any major side effects of concerns and current dose seems reasonable too.  Current medication regime is effective. Continue current treatment without any changes.  - valACYclovir (VALTREX) 1000 mg tablet; Take 2 tablets twice per day at the onset of cold sore.  Dispense: 4 tablet; Refill: 11  - Comprehensive metabolic panel    5. Hyperlipidemia LDL goal <130   Patient is tolerating current medication without any major side effects of concerns and current dose seems reasonable too.  Current medication regime is effective. Continue current treatment without any changes.   - simvastatin (ZOCOR) 20 MG tablet; TAKE 1 TABLET BY MOUTH NIGHTLY AT BEDTIME.  Dispense: 90 tablet; Refill: 3  - Lipid panel reflex to direct LDL Fasting  - Comprehensive metabolic panel    COUNSELING:   Reviewed preventive health counseling, as reflected in patient instructions  Special attention given to:        Regular exercise       Healthy diet/nutrition       Vision screening       Hearing screening       Colon cancer screening       Prostate cancer screening           reports that he has quit smoking. He has never used smokeless tobacco.      Estimated body mass index is 27.52 kg/(m^2) as calculated from the following:    Height as of this encounter: 5' 8\" (1.727 m).    Weight as of this encounter: 181 lb (82.1 kg).   Weight management plan: Discussed healthy diet and exercise guidelines and patient will follow up in 12 months in clinic to re-evaluate.    Counseling Resources:  ATP IV Guidelines  Pooled Cohorts Equation Calculator  FRAX Risk Assessment  ICSI Preventive Guidelines  Dietary Guidelines for Americans, " 2010  [employer] MyPlate  ASA Prophylaxis  Lung CA Screening    Lefty Chang MD, MD  [facility]  Answers for HPI/ROS submitted by the patient on 12/13/2017   PHQ-2 Score: 0    Injectable Influenza Immunization Documentation    1.  Is the person to be vaccinated sick today?   No    2. Does the person to be vaccinated have an allergy to a component   of the vaccine?   No  Egg Allergy Algorithm Link    3. Has the person to be vaccinated ever had a serious reaction   to influenza vaccine in the past?   No    4. Has the person to be vaccinated ever had Guillain-Barré syndrome?   No    Form completed by Tarah Dias MA

## 2020-10-26 ENCOUNTER — VIRTUAL VISIT (OUTPATIENT)
Dept: FAMILY MEDICINE | Facility: OTHER | Age: 50
End: 2020-10-26
Payer: COMMERCIAL

## 2020-10-26 DIAGNOSIS — Z20.822 SUSPECTED 2019 NOVEL CORONAVIRUS INFECTION: Primary | ICD-10-CM

## 2020-10-26 PROCEDURE — U0003 INFECTIOUS AGENT DETECTION BY NUCLEIC ACID (DNA OR RNA); SEVERE ACUTE RESPIRATORY SYNDROME CORONAVIRUS 2 (SARS-COV-2) (CORONAVIRUS DISEASE [COVID-19]), AMPLIFIED PROBE TECHNIQUE, MAKING USE OF HIGH THROUGHPUT TECHNOLOGIES AS DESCRIBED BY CMS-2020-01-R: HCPCS | Performed by: FAMILY MEDICINE

## 2020-10-26 PROCEDURE — 99421 OL DIG E/M SVC 5-10 MIN: CPT | Performed by: PHYSICIAN ASSISTANT

## 2020-10-26 NOTE — PROGRESS NOTES
"Date: 10/26/2020 09:13:34  Clinician: Timothy Collazo  Clinician NPI: [id number]  Patient: Joaquin Dacosta  Patient : 1970  Patient Address: [address] [place] [address]  Patient Phone: [phone]  Visit Protocol: URI  Patient Summary:  Joaquin is a 49 year old ( : 1970 ) male who initiated a [facility] Visit for COVID-19 (Coronavirus) evaluation and screening. When asked the question \"Please sign me up to receive news, health information and promotions from [facility].\", Joaquin responded \"Yes\".    Joaquin states his symptoms started today.   His symptoms consist of a headache, myalgia, chills, and malaise. Joaquin also feels feverish.   Symptom details     Temperature: His current temperature is 100.4 degrees Fahrenheit. Joaquin has had a temperature over 100 degrees Fahrenheit for 1-2 days.     Headache: He states the headache is mild (1-3 on a 10 point pain scale).      Joaquin denies having ear pain, wheezing, enlarged lymph nodes, cough, nasal congestion, anosmia, vomiting, rhinitis, nausea, facial pain or pressure, sore throat, teeth pain, ageusia, and diarrhea. He also denies having a sinus infection within the past year, taking antibiotic medication in the past month, and having recent facial or sinus surgery in the past 60 days. He is not experiencing dyspnea.   Precipitating events  He has not recently been exposed to someone with influenza. Joaquin has been in close contact with the following high risk individuals: people with asthma, heart disease or diabetes.   Pertinent COVID-19 (Coronavirus) information  In the past 14 days, Joaquin has not worked in a congregate living setting.   He does not work or volunteer as healthcare worker or a  and does not work or volunteer in a healthcare facility.   Joaquin also has not lived in a congregate living setting in the past 14 days. He does not live with a healthcare worker.   Joaquin has not had a close contact with a laboratory-confirmed COVID-19 " patient within 14 days of symptom onset.   Since December 2019, Joaquin and has not had upper respiratory infection or influenza-like illness. Has not been diagnosed with lab-confirmed COVID-19 test   Pertinent medical history  Joaquin needs a return to work/school note.   Weight: 180 lbs   Joaquin does not smoke or use smokeless tobacco.   Additional information as reported by the patient (free text): I have been having some stomach issues lately, which I attributed to an increase of ibuprofen use because I had gotten a crown done at the dentist.   Weight: 180 lbs    MEDICATIONS: Adult Probiotic oral, Complete Multivitamin-Multimineral oral, melatonin oral, Allegra Allergy oral, atorvastatin oral, ALLERGIES: NKDA  Clinician Response:  Dear Joaquin,   Your symptoms show that you may have coronavirus (COVID-19). This illness can cause fever, cough and trouble breathing. Many people get a mild case and get better on their own. Some people can get very sick.  What should I do?  We would like to test you for this virus.   1. Please call [phone] to schedule your visit. Explain that you were referred by [facility] hospital to have a COVID-19 test. Be ready to share your Critical access hospital visit ID number.  Please note that if you are assessed for Covid-19 testing and receive an order for testing from Critical access hospital, that the scheduling of your Covid test at [facility] may be delayed by three or four days or more due to limited availability for testing. Additional options for testing can be found on the [place] Covid-19 Response website. https://[place].gov/covid19/    The following will serve as your written order for this COVID Test, ordered by me, for the indication of suspected COVID [Z20.828]: The test will be ordered in [facility], our electronic health record, after you are scheduled. It will show as ordered and authorized by Mohan Baires MD.  Order: COVID-19 (Coronavirus) PCR for SYMPTOMATIC testing from Critical access hospital.   2. When it's time for your COVID test:   "Stay at least 6 feet away from others. (If someone will drive you to your test, stay in the backseat, as far away from the  as you can.)   Cover your mouth and nose with a mask, tissue or washcloth.  Go straight to the testing site. Don't make any stops on the way there or back.      3.Starting now: Stay home and away from others (self-isolate) until:   You've had no fever---and no medicine that reduces fever---for one full day (24 hours). And...   Your other symptoms have gotten better. For example, your cough or breathing has improved. And...   At least 10 days have passed since your symptoms started.       During this time, don't leave the house except for testing or medical care.   Stay in your own room, even for meals. Use your own bathroom if you can.   Stay away from others in your home. No hugging, kissing or shaking hands. No visitors.  Don't go to work, school or anywhere else.    Clean \"high touch\" surfaces often (doorknobs, counters, handles, etc.). Use a household cleaning spray or wipes. You'll find a full list of  on the EPA website: www.epa.gov/pesticide-registration/list-n-disinfectants-use-against-sars-cov-2.   Cover your mouth and nose with a mask, tissue or washcloth to avoid spreading germs.  Wash your hands and face often. Use soap and water.  Caregivers in these groups are at risk for severe illness due to COVID-19:  o People 65 years and older  o People who live in a nursing home or long-term care facility  o People with chronic disease (lung, heart, cancer, diabetes, kidney, liver, immunologic)  o People who have a weakened immune system, including those who:   Are in cancer treatment  Take medicine that weakens the immune system, such as corticosteroids  Had a bone marrow or organ transplant  Have an immune deficiency  Have poorly controlled HIV or AIDS  Are obese (body mass index of 40 or higher)  Smoke regularly   o Caregivers should wear gloves while washing dishes, " handling laundry and cleaning bedrooms and bathrooms.  o Use caution when washing and drying laundry: Don't shake dirty laundry, and use the warmest water setting that you can.  o For more tips, go to www.cdc.gov/coronavirus/2019-ncov/downloads/10Things.pdf.       How can I take care of myself?   Get lots of rest. Drink extra fluids (unless a doctor has told you not to).   Take Tylenol (acetaminophen) for fever or pain. If you have liver or kidney problems, ask your family doctor if it's okay to take Tylenol.   Adults can take either:    650 mg (two 325 mg pills) every 4 to 6 hours, or...   1,000 mg (two 500 mg pills) every 8 hours as needed.    Note: Don't take more than 3,000 mg in one day. Acetaminophen is found in many medicines (both prescribed and over-the-counter medicines). Read all labels to be sure you don't take too much.   For children, check the Tylenol bottle for the right dose. The dose is based on the child's age or weight.    If you have other health problems (like cancer, heart failure, an organ transplant or severe kidney disease): Call your specialty clinic if you don't feel better in the next 2 days.       Know when to call 911. Emergency warning signs include:    Trouble breathing or shortness of breath Pain or pressure in the chest that doesn't go away Feeling confused like you haven't felt before, or not being able to wake up Bluish-colored lips or face.  Where can I get more information?   [facility] -- About COVID-19: [email or web address]   CDC -- What to Do If You're Sick: www.cdc.gov/coronavirus/2019-ncov/about/steps-when-sick.html   CDC -- Ending Home Isolation: www.cdc.gov/coronavirus/2019-ncov/hcp/disposition-in-home-patients.html   CDC -- Caring for Someone: www.cdc.gov/coronavirus/2019-ncov/if-you-are-sick/care-for-someone.html   [facility] -- Interim Guidance for Hospital Discharge to Home: www.health.[facility].[place]./diseases/coronavirus/hcp/hospdischarge.pdf   [facility]  [place] clinical trials (COVID-19 research studies): [email or web address].[facility]-clinical-trials    Below are the COVID-19 hotlines at the [facility] ([facility]). Interpreters are available.    For health questions: Call 630-778-1069 or 1-108.759.9226 (7 a.m. to 7 p.m.) For questions about schools and childcare: Call 512-088-1391 or 1-835.528.9087 (7 a.m. to 7 p.m.)    Diagnosis: Contact with and (suspected) exposure to other viral communicable diseases  Diagnosis ICD: Z20.828

## 2020-10-27 LAB
SARS-COV-2 RNA SPEC QL NAA+PROBE: NOT DETECTED
SPECIMEN SOURCE: NORMAL

## 2020-11-08 ENCOUNTER — HEALTH MAINTENANCE LETTER (OUTPATIENT)
Age: 50
End: 2020-11-08

## 2021-01-30 DIAGNOSIS — E78.5 HYPERLIPIDEMIA LDL GOAL <130: ICD-10-CM

## 2021-02-01 ASSESSMENT — ENCOUNTER SYMPTOMS
FREQUENCY: 0
SHORTNESS OF BREATH: 0
EYE PAIN: 0
HEADACHES: 0
ABDOMINAL PAIN: 0
MYALGIAS: 0
PARESTHESIAS: 0
DYSURIA: 0
NAUSEA: 1
NERVOUS/ANXIOUS: 0
CONSTIPATION: 0
HEMATOCHEZIA: 0
CHILLS: 0
HEMATURIA: 0
DIARRHEA: 0
ARTHRALGIAS: 1
SORE THROAT: 0
HEARTBURN: 0
DIZZINESS: 0
FEVER: 0
PALPITATIONS: 0
COUGH: 0
JOINT SWELLING: 0
WEAKNESS: 0

## 2021-02-03 RX ORDER — ATORVASTATIN CALCIUM 40 MG/1
40 TABLET, FILM COATED ORAL DAILY
Qty: 30 TABLET | Refills: 0 | Status: SHIPPED | OUTPATIENT
Start: 2021-02-03 | End: 2021-02-08

## 2021-02-03 NOTE — TELEPHONE ENCOUNTER
Medication is being filled for 1 time refill only due to:  Patient needs to be seen because it has been more than one year since last visit.     [facility]

## 2021-02-08 ENCOUNTER — OFFICE VISIT (OUTPATIENT)
Dept: FAMILY MEDICINE | Facility: CLINIC | Age: 51
End: 2021-02-08
Payer: COMMERCIAL

## 2021-02-08 VITALS
OXYGEN SATURATION: 99 % | BODY MASS INDEX: 26.98 KG/M2 | HEART RATE: 62 BPM | HEIGHT: 68 IN | TEMPERATURE: 97.8 F | DIASTOLIC BLOOD PRESSURE: 84 MMHG | SYSTOLIC BLOOD PRESSURE: 122 MMHG | RESPIRATION RATE: 16 BRPM | WEIGHT: 178 LBS

## 2021-02-08 DIAGNOSIS — M77.12 LEFT TENNIS ELBOW: ICD-10-CM

## 2021-02-08 DIAGNOSIS — E78.5 HYPERLIPIDEMIA LDL GOAL <130: ICD-10-CM

## 2021-02-08 DIAGNOSIS — Z12.11 SCREEN FOR COLON CANCER: ICD-10-CM

## 2021-02-08 DIAGNOSIS — B00.1 RECURRENT HERPES LABIALIS: ICD-10-CM

## 2021-02-08 DIAGNOSIS — Z00.00 ROUTINE GENERAL MEDICAL EXAMINATION AT A HEALTH CARE FACILITY: Primary | ICD-10-CM

## 2021-02-08 DIAGNOSIS — L98.9 SKIN LESION: ICD-10-CM

## 2021-02-08 DIAGNOSIS — Z11.59 NEED FOR HEPATITIS C SCREENING TEST: ICD-10-CM

## 2021-02-08 DIAGNOSIS — Z23 NEED FOR PROPHYLACTIC VACCINATION AND INOCULATION AGAINST INFLUENZA: ICD-10-CM

## 2021-02-08 LAB
ALBUMIN SERPL-MCNC: 4.2 G/DL (ref 3.4–5)
ALP SERPL-CCNC: 68 U/L (ref 40–150)
ALT SERPL W P-5'-P-CCNC: 76 U/L (ref 0–70)
ANION GAP SERPL CALCULATED.3IONS-SCNC: 2 MMOL/L (ref 3–14)
AST SERPL W P-5'-P-CCNC: 33 U/L (ref 0–45)
BILIRUB SERPL-MCNC: 0.6 MG/DL (ref 0.2–1.3)
BUN SERPL-MCNC: 17 MG/DL (ref 7–30)
CALCIUM SERPL-MCNC: 10.2 MG/DL (ref 8.5–10.1)
CHLORIDE SERPL-SCNC: 107 MMOL/L (ref 94–109)
CHOLEST SERPL-MCNC: 195 MG/DL
CO2 SERPL-SCNC: 29 MMOL/L (ref 20–32)
CREAT SERPL-MCNC: 1.03 MG/DL (ref 0.66–1.25)
GFR SERPL CREATININE-BSD FRML MDRD: 84 ML/MIN/{1.73_M2}
GLUCOSE SERPL-MCNC: 106 MG/DL (ref 70–99)
HCV AB SERPL QL IA: NONREACTIVE
HDLC SERPL-MCNC: 71 MG/DL
LDLC SERPL CALC-MCNC: 106 MG/DL
NONHDLC SERPL-MCNC: 124 MG/DL
POTASSIUM SERPL-SCNC: 4.5 MMOL/L (ref 3.4–5.3)
PROT SERPL-MCNC: 7.9 G/DL (ref 6.8–8.8)
SODIUM SERPL-SCNC: 138 MMOL/L (ref 133–144)
TRIGL SERPL-MCNC: 90 MG/DL

## 2021-02-08 PROCEDURE — 80061 LIPID PANEL: CPT | Performed by: FAMILY MEDICINE

## 2021-02-08 PROCEDURE — 90682 RIV4 VACC RECOMBINANT DNA IM: CPT | Performed by: FAMILY MEDICINE

## 2021-02-08 PROCEDURE — 36415 COLL VENOUS BLD VENIPUNCTURE: CPT | Performed by: FAMILY MEDICINE

## 2021-02-08 PROCEDURE — 99213 OFFICE O/P EST LOW 20 MIN: CPT | Mod: 25 | Performed by: FAMILY MEDICINE

## 2021-02-08 PROCEDURE — 90471 IMMUNIZATION ADMIN: CPT | Performed by: FAMILY MEDICINE

## 2021-02-08 PROCEDURE — 80053 COMPREHEN METABOLIC PANEL: CPT | Performed by: FAMILY MEDICINE

## 2021-02-08 PROCEDURE — 99396 PREV VISIT EST AGE 40-64: CPT | Mod: 25 | Performed by: FAMILY MEDICINE

## 2021-02-08 PROCEDURE — 86803 HEPATITIS C AB TEST: CPT | Performed by: FAMILY MEDICINE

## 2021-02-08 RX ORDER — VALACYCLOVIR HYDROCHLORIDE 1 G/1
TABLET, FILM COATED ORAL
Qty: 4 TABLET | Refills: 11 | Status: SHIPPED | OUTPATIENT
Start: 2021-02-08 | End: 2022-07-28

## 2021-02-08 RX ORDER — ATORVASTATIN CALCIUM 40 MG/1
40 TABLET, FILM COATED ORAL DAILY
Qty: 90 TABLET | Refills: 3 | Status: SHIPPED | OUTPATIENT
Start: 2021-02-08 | End: 2022-04-19

## 2021-02-08 RX ORDER — ATORVASTATIN CALCIUM 40 MG/1
40 TABLET, FILM COATED ORAL DAILY
Qty: 90 TABLET | Refills: 3 | Status: SHIPPED | OUTPATIENT
Start: 2021-02-08 | End: 2021-02-08

## 2021-02-08 RX ORDER — TRIAMCINOLONE ACETONIDE 1 MG/ML
LOTION TOPICAL 3 TIMES DAILY
Qty: 120 ML | Refills: 0 | Status: SHIPPED | OUTPATIENT
Start: 2021-02-08 | End: 2022-07-28

## 2021-02-08 ASSESSMENT — MIFFLIN-ST. JEOR: SCORE: 1632.93

## 2021-02-08 ASSESSMENT — ENCOUNTER SYMPTOMS
WEAKNESS: 0
CONSTIPATION: 0
FEVER: 0
FREQUENCY: 0
HEMATURIA: 0
JOINT SWELLING: 0
CHILLS: 0
MYALGIAS: 0
DIZZINESS: 0
DIARRHEA: 0
PALPITATIONS: 0
ARTHRALGIAS: 1
DYSURIA: 0
NAUSEA: 1
ABDOMINAL PAIN: 0
SHORTNESS OF BREATH: 0
HEMATOCHEZIA: 0
EYE PAIN: 0
PARESTHESIAS: 0
HEARTBURN: 0
SORE THROAT: 0
HEADACHES: 0
NERVOUS/ANXIOUS: 0
COUGH: 0

## 2021-02-08 NOTE — PROGRESS NOTES
SUBJECTIVE:   CC: Joaquin Dacosta is an 50 year old male who presents for preventative health visit.       Patient has been advised of split billing requirements and indicates understanding: Yes  Imm/Inj  Associated symptoms include arthralgias, nausea and a rash. Pertinent negatives include no abdominal pain, chest pain, chills, congestion, coughing, fever, headaches, joint swelling, myalgias, sore throat or weakness.   Healthy Habits:     Getting at least 3 servings of Calcium per day:  Yes    Bi-annual eye exam:  Yes    Dental care twice a year:  Yes    Sleep apnea or symptoms of sleep apnea:  Excessive snoring    Diet:  Regular (no restrictions)    Frequency of exercise:  None    Taking medications regularly:  Yes    Barriers to taking medications:  None    PHQ-2 Total Score: 0    Additional concerns today:  Yes (left elbow pain, for 2-3 months, no trauma, sharp and dull ache, intermittent.)  Musculoskeletal Problem  Associated symptoms include arthralgias, nausea and a rash. Pertinent negatives include no abdominal pain, chest pain, chills, congestion, coughing, fever, headaches, joint swelling, myalgias, sore throat or weakness.     Today's PHQ-2 Score:   PHQ-2 ( 1999 Pfizer) 2/8/2021   Q1: Little interest or pleasure in doing things 0   Q2: Feeling down, depressed or hopeless 0   PHQ-2 Score 0   Q1: Little interest or pleasure in doing things -   Q2: Feeling down, depressed or hopeless -   PHQ-2 Score -     Abuse: Current or Past(Physical, Sexual or Emotional)- No  Do you feel safe in your environment? Yes    Social History     Tobacco Use     Smoking status: Former Smoker     Smokeless tobacco: Never Used     Tobacco comment: tried in  college   Substance Use Topics     Alcohol use: Not Currently     Comment: about  1 -2 drink per day- glass of wine at dinner     If you drink alcohol do you typically have >3 drinks per day or >7 drinks per week? Yes    Alcohol Use 2/1/2021   Prescreen: >3 drinks/day or >7  drinks/week? Yes   Prescreen: >3 drinks/day or >7 drinks/week? -   AUDIT SCORE  8     AUDIT - Alcohol Use Disorders Identification Test - Reproduced from the World Health Organization Audit 2001 (Second Edition) 2/1/2021   1.  How often do you have a drink containing alcohol? 4 or more times a week   2.  How many drinks containing alcohol do you have on a typical day when you are drinking? 1 or 2   3.  How often do you have five or more drinks on one occasion? Weekly   4.  How often during the last year have you found that you were not able to stop drinking once you had started? Never   5.  How often during the last year have you failed to do what was normally expected of you because of drinking? Never   6.  How often during the last year have you needed a first drink in the morning to get yourself going after a heavy drinking session? Never   7.  How often during the last year have you had a feeling of guilt or remorse after drinking? Less than monthly   8.  How often during the last year have you been unable to remember what happened the night before because of your drinking? Never   9.  Have you or someone else been injured because of your drinking? No   10. Has a relative, friend, doctor or other health care worker been concerned about your drinking or suggested you cut down? No   TOTAL SCORE 8       Last PSA:   PSA   Date Value Ref Range Status   01/06/2020 1.78 0 - 4 ug/L Final     Comment:     Assay Method:  Chemiluminescence using Siemens Vista analyzer       Reviewed orders with patient. Reviewed health maintenance and updated orders accordingly - Yes     Reviewed and updated as needed this visit by clinical staff  Tobacco  Allergies  Meds   Med Hx  Surg Hx  Fam Hx  Soc Hx      Reviewed and updated as needed this visit by Provider    Elbow - 4 months ago - picking things up elbow hruts. Left side of elbow on outer side. Right handed.   Noticing on non dominant side. No tingling or numbness in fingers.  "No swelling.     Rash - right thigh and side of torso. Its just spots. Face was getting dry but it is getting better. Just popped up and cream helped (?steroid or fungal cream). Some spots are itchy. Not all the lesions.     Not using rescue inhaler regularly. Uses around once every few months or so.     Review of Systems   Constitutional: Negative for chills and fever.   HENT: Negative for congestion, ear pain, hearing loss and sore throat.    Eyes: Negative for pain and visual disturbance.   Respiratory: Negative for cough and shortness of breath.    Cardiovascular: Negative for chest pain, palpitations and peripheral edema.   Gastrointestinal: Positive for nausea. Negative for abdominal pain, constipation, diarrhea, heartburn and hematochezia.   Genitourinary: Negative for discharge, dysuria, frequency, genital sores, hematuria, impotence and urgency.   Musculoskeletal: Positive for arthralgias. Negative for joint swelling and myalgias.   Skin: Positive for rash.   Neurological: Negative for dizziness, weakness, headaches and paresthesias.   Psychiatric/Behavioral: Negative for mood changes. The patient is not nervous/anxious.         OBJECTIVE:   /84 (BP Location: Right arm, Patient Position: Sitting, Cuff Size: Adult Regular)   Pulse 62   Temp 97.8  F (36.6  C) (Oral)   Resp 16   Ht 1.721 m (5' 7.75\")   Wt 80.7 kg (178 lb)   SpO2 99%   BMI 27.26 kg/m      Physical Exam  GENERAL: healthy, alert and no distress  EYES: Eyes grossly normal to inspection, PERRL and conjunctivae and sclerae normal  HENT: ear canals and TM's normal, nose and mouth without ulcers or lesions  NECK: no adenopathy, no asymmetry, masses, or scars and thyroid normal to palpation  RESP: lungs clear to auscultation - no rales, rhonchi or wheezes  CV: regular rate and rhythm, normal S1 S2, no S3 or S4, no murmur, click or rub, no peripheral edema and peripheral pulses strong  ABDOMEN: soft, nontender, no hepatosplenomegaly, no " masses and bowel sounds normal   (male): normal male genitalia without lesions or urethral discharge, no hernia  MS: left elbow - lateral epicondyl tenderness, rom not restricted.  nromal.   SKIN: diffuse scattered well circumcised erythematous lesions from 0.3 cm to 0.5 cm in size with mild scaling of some lesions.   NEURO: Normal strength and tone, mentation intact and speech normal  PSYCH: mentation appears normal, affect normal/bright         ASSESSMENT/PLAN:   1. Routine general medical examination at a health care facility       2. Screen for colon cancer     - GASTROENTEROLOGY ADULT REF PROCEDURE ONLY; Future    3. Need for hepatitis C screening test     - Hepatitis C Screen Reflex to HCV RNA Quant and Genotype    4. Hyperlipidemia LDL goal <130     - Lipid panel reflex to direct LDL Fasting  - COMPREHENSIVE METABOLIC PANEL  - atorvastatin (LIPITOR) 40 MG tablet; Take 1 tablet (40 mg) by mouth daily  Dispense: 90 tablet; Refill: 3    5. Recurrent herpes labialis     - valACYclovir (VALTREX) 1000 mg tablet; Take 2 tablets twice per day at the onset of cold sore.  Dispense: 4 tablet; Refill: 11    6. Left tennis elbow  Discussed to start with hand therapy and ice. If fails - call for sports med referral.   - [facility] PT, HAND, AND CHIROPRACTIC REFERRAL; Future    7. Skin lesion  Unclear etiology. Trial of topical steroid but if that fails - call for derm referral. Does not have typical fungal presentation.   - triamcinolone (KENALOG) 0.1 % external lotion; Apply topically 3 times daily For 2 weeks.  Dispense: 120 mL; Refill: 0    8. Need for prophylactic vaccination and inoculation against influenza     - INFLUENZA QUAD, RECOMBINANT, P-FREE (RIV4) (FLUBLOCK) [61001]    Patient has been advised of split billing requirements and indicates understanding: Yes  COUNSELING:   Reviewed preventive health counseling, as reflected in patient instructions  Special attention given to:        Regular exercise       Healthy  "diet/nutrition       Vision screening       Hearing screening       Colon cancer screening       Prostate cancer screening    Estimated body mass index is 27.32 kg/m  as calculated from the following:    Height as of 1/6/20: 1.734 m (5' 8.25\").    Weight as of 1/6/20: 82.1 kg (181 lb).     Weight management plan: Discussed healthy diet and exercise guidelines    He reports that he has quit smoking. He has never used smokeless tobacco.      Counseling Resources:  ATP IV Guidelines  Pooled Cohorts Equation Calculator  FRAX Risk Assessment  ICSI Preventive Guidelines  Dietary Guidelines for Americans, 2010  USDA's MyPlate  ASA Prophylaxis  Lung CA Screening    Lefty Chang MD, MD  [facility]  "

## 2021-02-08 NOTE — PATIENT INSTRUCTIONS
Preventive Health Recommendations  Male Ages 50 - 64    Yearly exam:             See your health care provider every year in order to  o   Review health changes.   o   Discuss preventive care.    o   Review your medicines if your doctor has prescribed any.     Have a cholesterol test every 5 years, or more frequently if you are at risk for high cholesterol/heart disease.     Have a diabetes test (fasting glucose) every three years. If you are at risk for diabetes, you should have this test more often.     Have a colonoscopy at age 50, or have a yearly FIT test (stool test). These exams will check for colon cancer.      Talk with your health care provider about whether or not a prostate cancer screening test (PSA) is right for you.    You should be tested each year for STDs (sexually transmitted diseases), if you re at risk.     Shots: Get a flu shot each year. Get a tetanus shot every 10 years.     Nutrition:    Eat at least 5 servings of fruits and vegetables daily.     Eat whole-grain bread, whole-wheat pasta and brown rice instead of white grains and rice.     Get adequate Calcium and Vitamin D.     Lifestyle    Exercise for at least 150 minutes a week (30 minutes a day, 5 days a week). This will help you control your weight and prevent disease.     Limit alcohol to one drink per day.     No smoking.     Wear sunscreen to prevent skin cancer.     See your dentist every six months for an exam and cleaning.     See your eye doctor every 1 to 2 years.           Check shingles vaccine.

## 2021-02-09 ENCOUNTER — TELEPHONE (OUTPATIENT)
Dept: GASTROENTEROLOGY | Facility: CLINIC | Age: 51
End: 2021-02-09

## 2021-02-09 ASSESSMENT — ASTHMA QUESTIONNAIRES: ACT_TOTALSCORE: 25

## 2021-02-09 NOTE — TELEPHONE ENCOUNTER
1st schedule attempt    Procedure: Lower Endoscopy    Lower Endoscopy Type: Colonoscopy    Purpose of Colonoscopy Procedure: Screening    Colonoscopy Sedation: Conscious/Moderate    Preferred Location: [facility]    Scheduling Instructions: If you have not heard from the scheduling office within 2 business days, please call [phone].           Associated Diagnoses    Screen for colon cancer [Z12.11]

## 2021-09-11 ENCOUNTER — HEALTH MAINTENANCE LETTER (OUTPATIENT)
Age: 51
End: 2021-09-11

## 2021-10-21 ENCOUNTER — TRANSFERRED RECORDS (OUTPATIENT)
Dept: HEALTH INFORMATION MANAGEMENT | Facility: CLINIC | Age: 51
End: 2021-10-21
Payer: COMMERCIAL

## 2022-04-18 DIAGNOSIS — E78.5 HYPERLIPIDEMIA LDL GOAL <130: ICD-10-CM

## 2022-04-19 RX ORDER — ATORVASTATIN CALCIUM 40 MG/1
40 TABLET, FILM COATED ORAL DAILY
Qty: 90 TABLET | Refills: 0 | Status: SHIPPED | OUTPATIENT
Start: 2022-04-19 | End: 2022-07-28

## 2022-04-19 NOTE — TELEPHONE ENCOUNTER
Routing refill request to provider for review/approval because:  Labs out of range:  LDL  LDL Cholesterol Calculated   Date Value Ref Range Status   02/08/2021 106 (H) <100 mg/dL Final     Comment:     Above desirable:  100-129 mg/dl  Borderline High:  130-159 mg/dL  High:             160-189 mg/dL  Very high:       >189 mg/dl       Team Coordinators: Please contact patient to set up annual physical for any further refills.       MORE BryanN RN  [facility]

## 2022-04-23 ENCOUNTER — HEALTH MAINTENANCE LETTER (OUTPATIENT)
Age: 52
End: 2022-04-23

## 2022-07-26 ASSESSMENT — ENCOUNTER SYMPTOMS
SHORTNESS OF BREATH: 0
CHILLS: 0
DIARRHEA: 0
FEVER: 0
FREQUENCY: 0
DIZZINESS: 0
JOINT SWELLING: 0
ARTHRALGIAS: 0
HEMATURIA: 0
CONSTIPATION: 0
SORE THROAT: 0
ABDOMINAL PAIN: 0
WEAKNESS: 0
DYSURIA: 0
NERVOUS/ANXIOUS: 0
HEARTBURN: 0
HEADACHES: 0
EYE PAIN: 0
COUGH: 0
PALPITATIONS: 0
MYALGIAS: 0
HEMATOCHEZIA: 0
NAUSEA: 0
PARESTHESIAS: 1

## 2022-07-28 ENCOUNTER — TELEPHONE (OUTPATIENT)
Dept: INTERNAL MEDICINE | Facility: CLINIC | Age: 52
End: 2022-07-28

## 2022-07-28 ENCOUNTER — OFFICE VISIT (OUTPATIENT)
Dept: INTERNAL MEDICINE | Facility: CLINIC | Age: 52
End: 2022-07-28
Payer: COMMERCIAL

## 2022-07-28 VITALS
OXYGEN SATURATION: 97 % | SYSTOLIC BLOOD PRESSURE: 122 MMHG | HEIGHT: 68 IN | HEART RATE: 75 BPM | WEIGHT: 176.1 LBS | DIASTOLIC BLOOD PRESSURE: 84 MMHG | RESPIRATION RATE: 18 BRPM | BODY MASS INDEX: 26.69 KG/M2 | TEMPERATURE: 97.3 F

## 2022-07-28 DIAGNOSIS — H61.23 BILATERAL IMPACTED CERUMEN: ICD-10-CM

## 2022-07-28 DIAGNOSIS — R40.0 DAYTIME SLEEPINESS: ICD-10-CM

## 2022-07-28 DIAGNOSIS — E78.5 HYPERLIPIDEMIA LDL GOAL <130: ICD-10-CM

## 2022-07-28 DIAGNOSIS — Z12.11 SCREEN FOR COLON CANCER: ICD-10-CM

## 2022-07-28 DIAGNOSIS — Z00.00 ROUTINE GENERAL MEDICAL EXAMINATION AT A HEALTH CARE FACILITY: Primary | ICD-10-CM

## 2022-07-28 DIAGNOSIS — R06.83 SNORING: ICD-10-CM

## 2022-07-28 DIAGNOSIS — B00.1 RECURRENT HERPES LABIALIS: ICD-10-CM

## 2022-07-28 DIAGNOSIS — L29.9 PRURITIC DISORDER: ICD-10-CM

## 2022-07-28 DIAGNOSIS — Z12.5 SCREENING FOR PROSTATE CANCER: ICD-10-CM

## 2022-07-28 DIAGNOSIS — Z13.220 SCREENING FOR HYPERLIPIDEMIA: ICD-10-CM

## 2022-07-28 DIAGNOSIS — Z23 ENCOUNTER FOR IMMUNIZATION: ICD-10-CM

## 2022-07-28 LAB
BASOPHILS # BLD AUTO: 0 10E3/UL (ref 0–0.2)
BASOPHILS NFR BLD AUTO: 1 %
EOSINOPHIL # BLD AUTO: 0.1 10E3/UL (ref 0–0.7)
EOSINOPHIL NFR BLD AUTO: 1 %
ERYTHROCYTE [DISTWIDTH] IN BLOOD BY AUTOMATED COUNT: 13.1 % (ref 10–15)
HCT VFR BLD AUTO: 48.8 % (ref 40–53)
HGB BLD-MCNC: 16.2 G/DL (ref 13.3–17.7)
IMM GRANULOCYTES # BLD: 0 10E3/UL
IMM GRANULOCYTES NFR BLD: 0 %
LYMPHOCYTES # BLD AUTO: 1.7 10E3/UL (ref 0.8–5.3)
LYMPHOCYTES NFR BLD AUTO: 30 %
MCH RBC QN AUTO: 29.8 PG (ref 26.5–33)
MCHC RBC AUTO-ENTMCNC: 33.2 G/DL (ref 31.5–36.5)
MCV RBC AUTO: 90 FL (ref 78–100)
MONOCYTES # BLD AUTO: 0.6 10E3/UL (ref 0–1.3)
MONOCYTES NFR BLD AUTO: 10 %
NEUTROPHILS # BLD AUTO: 3.3 10E3/UL (ref 1.6–8.3)
NEUTROPHILS NFR BLD AUTO: 59 %
PLATELET # BLD AUTO: 231 10E3/UL (ref 150–450)
RBC # BLD AUTO: 5.43 10E6/UL (ref 4.4–5.9)
WBC # BLD AUTO: 5.6 10E3/UL (ref 4–11)

## 2022-07-28 PROCEDURE — 80053 COMPREHEN METABOLIC PANEL: CPT

## 2022-07-28 PROCEDURE — 80061 LIPID PANEL: CPT

## 2022-07-28 PROCEDURE — 85025 COMPLETE CBC W/AUTO DIFF WBC: CPT

## 2022-07-28 PROCEDURE — 99213 OFFICE O/P EST LOW 20 MIN: CPT | Mod: 25

## 2022-07-28 PROCEDURE — 99396 PREV VISIT EST AGE 40-64: CPT | Mod: 25

## 2022-07-28 PROCEDURE — 90715 TDAP VACCINE 7 YRS/> IM: CPT

## 2022-07-28 PROCEDURE — 83036 HEMOGLOBIN GLYCOSYLATED A1C: CPT

## 2022-07-28 PROCEDURE — 36415 COLL VENOUS BLD VENIPUNCTURE: CPT

## 2022-07-28 PROCEDURE — G0103 PSA SCREENING: HCPCS

## 2022-07-28 PROCEDURE — 90471 IMMUNIZATION ADMIN: CPT

## 2022-07-28 RX ORDER — VALACYCLOVIR HYDROCHLORIDE 1 G/1
TABLET, FILM COATED ORAL
Qty: 4 TABLET | Refills: 11 | Status: SHIPPED | OUTPATIENT
Start: 2022-07-28 | End: 2023-06-20

## 2022-07-28 RX ORDER — TRIAMCINOLONE ACETONIDE 1 MG/G
CREAM TOPICAL
COMMUNITY
Start: 2021-10-21 | End: 2024-08-06

## 2022-07-28 RX ORDER — DESONIDE 0.5 MG/G
CREAM TOPICAL PRN
COMMUNITY
Start: 2021-10-21

## 2022-07-28 RX ORDER — ATORVASTATIN CALCIUM 40 MG/1
40 TABLET, FILM COATED ORAL DAILY
Qty: 90 TABLET | Refills: 3 | Status: SHIPPED | OUTPATIENT
Start: 2022-07-28 | End: 2023-06-20

## 2022-07-28 ASSESSMENT — ENCOUNTER SYMPTOMS
HEARTBURN: 0
DYSURIA: 0
NAUSEA: 0
HEADACHES: 0
DIARRHEA: 0
SHORTNESS OF BREATH: 0
EYE PAIN: 0
ARTHRALGIAS: 0
JOINT SWELLING: 0
FEVER: 0
HEMATOCHEZIA: 0
ABDOMINAL PAIN: 0
PALPITATIONS: 0
PARESTHESIAS: 0
WEAKNESS: 0
DIZZINESS: 0
MYALGIAS: 0
CHILLS: 0
NERVOUS/ANXIOUS: 0
FREQUENCY: 0
COUGH: 0
HEMATURIA: 0
CONSTIPATION: 0
SORE THROAT: 0

## 2022-07-28 ASSESSMENT — ASTHMA QUESTIONNAIRES: ACT_TOTALSCORE: 25

## 2022-07-28 NOTE — NURSING NOTE
Prior to injection, verified patient identity using patient's name and date of birth.  Due to injection administration, patient instructed to remain in clinic for 15 minutes afterwards, and to report any adverse reaction to me or the  staff immediately.      Drug Amount Wasted:  None.  Vial/Syringe: Syringe  Expiration Date:  7/12/24    Screening Questionnaire for Adult Immunization     Are you sick today?   No    Do you have allergies to medications, food or any vaccine?   No    Have you ever had a serious reaction after receiving a vaccination?   No    Do you have a long-term health problem with heart disease, lung disease,  asthma, kidney disease, diabetes, anemia, metabolic or blood disease?   No    Do you have cancer, leukemia, AIDS, or any immune system problem?   No    Do you take cortisone, prednisone, other steroids, or anticancer drugs, or  have you had any x-ray (radiation) treatments?   No    Have you had a seizure, brain, or other nervous system problem?   No    During the past year, have you received a transfusion of blood or blood       products, or been given a medicine called immune (gamma) globulin?   No    For women: Are you pregnant or is there a chance you could become         pregnant during the next month?   No    Have you received any vaccinations in the past 4 weeks?   No     Immunization questionnaire answers were all negative.      MNVFC doesn't apply on this patient     Screening performed by Erika Harris MA on 7/28/2022 at 9:53 AM.      Patient identified using two patient identifiers.  Ear exam showing wax occlusion completed by provider.  Solution: warm water and Hydrogen Peroxide was placed in the bilateral ear(s) via irrigation tool: elephant ear.

## 2022-07-28 NOTE — PROGRESS NOTES
SUBJECTIVE:   CC: Joaquin Dacosta is an 51 year old male who presents for preventative health visit.       Patient has been advised of split billing requirements and indicates understanding: Yes     Healthy Habits:     Getting at least 3 servings of Calcium per day:  Yes    Bi-annual eye exam:  Yes    Dental care twice a year:  Yes    Sleep apnea or symptoms of sleep apnea:  Daytime drowsiness and Excessive snoring    Diet:  Regular (no restrictions)    Frequency of exercise:  None    Taking medications regularly:  Yes    PHQ-2 Total Score: 1    Today's PHQ-2 Score:   PHQ-2 ( 1999 Pfizer) 7/26/2022   Q1: Little interest or pleasure in doing things 1   Q2: Feeling down, depressed or hopeless 0   PHQ-2 Score 1   PHQ-2 Total Score (12-17 Years)- Positive if 3 or more points; Administer PHQ-A if positive -   Q1: Little interest or pleasure in doing things Several days   Q2: Feeling down, depressed or hopeless Not at all   PHQ-2 Score 1       Abuse: Current or Past(Physical, Sexual or Emotional)- No  Do you feel safe in your environment? Yes    Have you ever done Advance Care Planning? (For example, a Health Directive, POLST, or a discussion with a medical provider or your loved ones about your wishes): No, advance care planning information given to patient to review.  Advanced care planning was discussed at today's visit.    Social History     Tobacco Use     Smoking status: Former Smoker     Smokeless tobacco: Never Used     Tobacco comment: tried in  college   Substance Use Topics     Alcohol use: Yes     Comment: about  1 -2 drink per day- glass of wine at dinner     If you drink alcohol do you typically have >3 drinks per day or >7 drinks per week? Yes      AUDIT - Alcohol Use Disorders Identification Test - Reproduced from the World Health Organization Audit 2001 (Second Edition) 7/26/2022   1.  How often do you have a drink containing alcohol? 4 or more times a week   2.  How many drinks containing alcohol do you  have on a typical day when you are drinking? 1 or 2   3.  How often do you have five or more drinks on one occasion? Less than monthly   4.  How often during the last year have you found that you were not able to stop drinking once you had started? Never   5.  How often during the last year have you failed to do what was normally expected of you because of drinking? Never   6.  How often during the last year have you needed a first drink in the morning to get yourself going after a heavy drinking session? Never   7.  How often during the last year have you had a feeling of guilt or remorse after drinking? Never   8.  How often during the last year have you been unable to remember what happened the night before because of your drinking? Never   9.  Have you or someone else been injured because of your drinking? No   10. Has a relative, friend, doctor or other health care worker been concerned about your drinking or suggested you cut down? No   TOTAL SCORE 5       Last PSA:   PSA   Date Value Ref Range Status   01/06/2020 1.78 0 - 4 ug/L Final     Comment:     Assay Method:  Chemiluminescence using Siemens Vista analyzer       Reviewed orders with patient. Reviewed health maintenance and updated orders accordingly - Yes  Lab work is in process    Reviewed and updated as needed this visit by clinical staff   Tobacco  Allergies  Meds  Problems  Med Hx  Surg Hx  Fam Hx  Soc   Hx          Reviewed and updated as needed this visit by Provider         Fam Hx           Past Medical History:   Diagnosis Date     Allergic rhinitis, cause unspecified      Hyperlipidemia LDL goal <130      Moderate persistent asthma      Recurrent herpes labialis 10/28/2008    Cold Sores - Takes Valtrex .prn      Past Surgical History:   Procedure Laterality Date     [facility] NONSPECIFIC PROCEDURE  1971    surgery- feet - infant [place].      [facility] ORAL SURGERY PROCEDURE  1989    [place] Teeth Removal       Review of Systems   Constitutional:  Negative for chills and fever.   HENT: Negative for congestion, ear pain, hearing loss and sore throat.    Eyes: Negative for pain and visual disturbance.   Respiratory: Negative for cough and shortness of breath.    Cardiovascular: Negative for chest pain, palpitations and peripheral edema.   Gastrointestinal: Negative for abdominal pain, constipation, diarrhea, heartburn, hematochezia and nausea.   Genitourinary: Negative for dysuria, frequency, genital sores, hematuria, impotence, penile discharge and urgency.   Musculoskeletal: Negative for arthralgias, joint swelling and myalgias.   Skin: Positive for rash.   Neurological: Negative for dizziness, weakness, headaches and paresthesias.   Psychiatric/Behavioral: Negative for mood changes. The patient is not nervous/anxious.      Itchiness on face and eyes starting in late June. Uses Pataday eye drops which provide relief for short period of time. Has been using lotion on face that has not helped symptoms. Denies pain warmth or swelling at site.    New mole on neck- declined derm referral stating he already has dermatologist- will follow up with them.    Will check into shingrex vaccine with insurance.    Has not needed albuterol in recent months    CONSTITUTIONAL: NEGATIVE for fever, chills, change in weight  INTEGUMENTARY/SKIN: NEGATIVE for worrisome rashes, moles or lesions  EYES: NEGATIVE for vision changes or irritation  ENT: NEGATIVE for ear, mouth and throat problems  RESP: NEGATIVE for significant cough or SOB  CV: NEGATIVE for chest pain, palpitations or peripheral edema  GI: NEGATIVE for nausea, abdominal pain, heartburn, or change in bowel habits   male: negative for dysuria, hematuria, decreased urinary stream, erectile dysfunction, urethral discharge  MUSCULOSKELETAL: NEGATIVE for significant arthralgias or myalgia  NEURO: NEGATIVE for weakness, dizziness or paresthesias  PSYCHIATRIC: NEGATIVE for changes in mood or affect    OBJECTIVE:   /84   " Pulse 75   Temp 97.3  F (36.3  C) (Tympanic)   Resp 18   Ht 1.727 m (5' 8\")   Wt 79.9 kg (176 lb 1.6 oz)   SpO2 97%   BMI 26.78 kg/m      Physical Exam  GENERAL: healthy, alert and no distress  EYES: Eyes grossly normal to inspection, PERRL and conjunctivae and sclerae normal  HENT: cerumen impacted in ears TM unable to visualize, nose and mouth without ulcers or lesions  NECK: no adenopathy, no asymmetry, masses, or scars and thyroid normal to palpation  RESP: lungs clear to auscultation - no rales, rhonchi or wheezes  CV: regular rate and rhythm, normal S1 S2, no S3 or S4, no murmur, click or rub, no peripheral edema and peripheral pulses strong  ABDOMEN: soft, nontender, no hepatosplenomegaly, no masses and bowel sounds normal  MS: no gross musculoskeletal defects noted, no edema  SKIN: no suspicious lesions or rashes, mole on L chin/neck  NEURO: Normal strength and tone, mentation intact and speech normal  PSYCH: mentation appears normal, affect normal/bright    Diagnostic Test Results:  Labs reviewed in Epic    ASSESSMENT/PLAN:   (Z00.00) Routine general medical examination at a health care facility  (primary encounter diagnosis)  Comment: Patient here for preventative visit. Overall feeling well.  Plan: CBC with Platelets & Differential,         Comprehensive metabolic panel, Colonscopy         Screening  Referral, PSA, screen            (Z12.11) Screen for colon cancer  Comment: fam hx of colon cancer.  Plan: Colonscopy Screening  Referral            (Z13.220) Screening for hyperlipidemia  Comment: On Lipitor fasting today will check  Plan: Lipid panel reflex to direct LDL Fasting            (E78.5) Hyperlipidemia LDL goal <130  Comment: Tolerating medication  Plan: atorvastatin (LIPITOR) 40 MG tablet            (B00.1) Recurrent herpes labialis  Comment: Infrequent use but would like a refill today  Plan: valACYclovir (VALTREX) 1000 mg tablet            (R06.83) Snoring  Comment: " "Chronic problem, loud snoring and daytime drowsiness, has had sleep referral previously but never followed up due to busy schedule. Another referral made today   Plan: Adult Sleep Eval & Management          Referral            (R40.0) Daytime sleepiness  Comment: Along with snoring  Plan: Adult Sleep Eval & Management          Referral            (Z12.5) Screening for prostate cancer  Comment: PSA screen  Plan: PSA, screen            (Z23) Encounter for immunization  Comment: Patient works as august elect to get TDAP early as to not miss dose when it becomes due  Plan: TDAP VACCINE (Adacel, Boostrix)  [[id number]]            (H61.23) Bilateral impacted cerumen  Comment: Bilateral cerumen impaction- will clean today      (L29.9) Pruritic disorder  Comment: itchy on forehead, no obvious irritation on skin exam. Has been using wifes lotion whichc provides relief for a little and then itching comes back.  Plan: Recommend using cerave, eucerin or aquafor.     Patient has been advised of split billing requirements and indicates understanding: Yes    COUNSELING:   Reviewed preventive health counseling, as reflected in patient instructions       Regular exercise       Healthy diet/nutrition       Colorectal cancer screening       Prostate cancer screening    Estimated body mass index is 26.78 kg/m  as calculated from the following:    Height as of this encounter: 1.727 m (5' 8\").    Weight as of this encounter: 79.9 kg (176 lb 1.6 oz).     Weight management plan: Discussed healthy diet and exercise guidelines    He reports that he has quit smoking. He has never used smokeless tobacco.      Counseling Resources:  ATP IV Guidelines  Pooled Cohorts Equation Calculator  FRAX Risk Assessment  ICSI Preventive Guidelines  Dietary Guidelines for Americans, 2010  USDA's MyPlate  ASA Prophylaxis  Lung CA Screening    Ankush Mattson NP  [facility]  "

## 2022-07-28 NOTE — PATIENT INSTRUCTIONS
Try cereva, or Eucerin for aphafor for forehead. Follow with derm if treatment not working    Xyzal for allergies possible.       Preventive Health Recommendations  Male Ages 50 - 64    Yearly exam:             See your health care provider every year in order to  o   Review health changes.   o   Discuss preventive care.    o   Review your medicines if your doctor has prescribed any.   Have a cholesterol test every 5 years, or more frequently if you are at risk for high cholesterol/heart disease.   Have a diabetes test (fasting glucose) every three years. If you are at risk for diabetes, you should have this test more often.   Have a colonoscopy at age 50, or have a yearly FIT test (stool test). These exams will check for colon cancer.    Talk with your health care provider about whether or not a prostate cancer screening test (PSA) is right for you.  You should be tested each year for STDs (sexually transmitted diseases), if you re at risk.     Shots: Get a flu shot each year. Get a tetanus shot every 10 years.     Nutrition:  Eat at least 5 servings of fruits and vegetables daily.   Eat whole-grain bread, whole-wheat pasta and brown rice instead of white grains and rice.   Get adequate Calcium and Vitamin D.     Lifestyle  Exercise for at least 150 minutes a week (30 minutes a day, 5 days a week). This will help you control your weight and prevent disease.   Limit alcohol to one drink per day.   No smoking.   Wear sunscreen to prevent skin cancer.   See your dentist every six months for an exam and cleaning.   See your eye doctor every 1 to 2 years.

## 2022-07-28 NOTE — TELEPHONE ENCOUNTER
Patient Quality Outreach    Patient is due for the following:   Asthma  -  ACT needed    NEXT STEPS:   No follow up needed at this time.  ACT was completed during office visit today    Type of outreach:    Chart review performed, no outreach needed.      Questions for provider review:    None     Erika Harris MA  Chart routed to closed.

## 2022-07-29 ENCOUNTER — TELEPHONE (OUTPATIENT)
Dept: GASTROENTEROLOGY | Facility: CLINIC | Age: 52
End: 2022-07-29

## 2022-07-29 LAB
ALBUMIN SERPL-MCNC: 3.9 G/DL (ref 3.4–5)
ALP SERPL-CCNC: 74 U/L (ref 40–150)
ALT SERPL W P-5'-P-CCNC: 49 U/L (ref 0–70)
ANION GAP SERPL CALCULATED.3IONS-SCNC: 10 MMOL/L (ref 3–14)
AST SERPL W P-5'-P-CCNC: 23 U/L (ref 0–45)
BILIRUB SERPL-MCNC: 0.9 MG/DL (ref 0.2–1.3)
BUN SERPL-MCNC: 18 MG/DL (ref 7–30)
CALCIUM SERPL-MCNC: 9.6 MG/DL (ref 8.5–10.1)
CHLORIDE BLD-SCNC: 106 MMOL/L (ref 94–109)
CHOLEST SERPL-MCNC: 192 MG/DL
CO2 SERPL-SCNC: 23 MMOL/L (ref 20–32)
CREAT SERPL-MCNC: 0.8 MG/DL (ref 0.66–1.25)
FASTING STATUS PATIENT QL REPORTED: YES
GFR SERPL CREATININE-BSD FRML MDRD: >90 ML/MIN/1.73M2
GLUCOSE BLD-MCNC: 109 MG/DL (ref 70–99)
HBA1C MFR BLD: 5.6 % (ref 0–5.6)
HDLC SERPL-MCNC: 78 MG/DL
LDLC SERPL CALC-MCNC: 95 MG/DL
NONHDLC SERPL-MCNC: 114 MG/DL
POTASSIUM BLD-SCNC: 4.3 MMOL/L (ref 3.4–5.3)
PROT SERPL-MCNC: 7.6 G/DL (ref 6.8–8.8)
PSA SERPL-MCNC: 1.36 UG/L (ref 0–4)
SODIUM SERPL-SCNC: 139 MMOL/L (ref 133–144)
TRIGL SERPL-MCNC: 95 MG/DL

## 2022-07-29 NOTE — TELEPHONE ENCOUNTER
Screening Questions    BlueKIND OF PREP RedLOCATION [review exclusion criteria] GreenSEDATION TYPE    Have you had a positive covid test in the last 90 days? N  If yes, what date?     Do you have a legal guardian or medical Power of ?  Are you able to give consent for your medical care?Y (Sedation review/consideration needed)    1. Are you active on mychart? Y    2. What insurance is in the chart? [employer]     3.   Ordering/Referring Provider: MAHI MADRIGAL    4. BMI 26.8 [BMI OVER 40-EXTENDED PREP]  If greater than 40 review exclusion criteria [PAC APPT IF (MAC) @ [facility]]      5.  Respiratory Screening:  [If yes to any of the following HOSPITAL setting only]     Do you use daily home oxygen? N    Do you have mod to severe Obstructive Sleep Apnea? N   [OKAY @ [facility] SH PH RI]   Do you have Pulmonary Hypertension? N     Do you have UNCONTROLLED asthma? N        6.   Have you had a heart or lung transplant? N      7.   Are you currently on dialysis? N [ If yes, G-PREP & HOSPITAL setting only]     8.   Do you have chronic kidney disease? N [ If yes, G-PREP ]    9.   Have you had a stroke or Transient ischemic attack (TIA - aka  mini stroke ) within 6 months?  N (If yes, please review exclusion criteria)         In the past 6 months, have you had any heart related issues including cardiomyopathy or heart attack? N           If yes, did it require cardiac stenting or other implantable device? N      10   Do you have any implantable devices in your body (pacemaker, defib, LVAD)? N (If yes, please review exclusion criteria)    11.   Do you take nitroglycerin? N            If yes, how often? N  (if yes, HOSPITAL setting ONLY)    12.   Are you currently taking any blood thinners? N           [IF YES, INFORM PATIENT TO FOLLOW UP W/ ORDERING PROVIDER FOR BRIDGING INSTRUCTIONS]     13.  22.  Do you take Phentermine? N     Yes-> Hold for 7 days before procedure.  Please consult your prescribing provider if you have  questions about holding this medication.    14.   Do you have a diagnosis of diabetes? N   [ If yes, G-PREP ]    15.   [FEMALES] Are you currently pregnant?     If yes, how many weeks?     16.   Are you taking any prescription pain medications on a routine schedule?  N  [ If yes, EXTENDED PREP.] [If yes, MAC]    17.   Do you have any chemical dependencies such as alcohol, street drugs, or methadone?  N [If yes, MAC]    18.   Do you have any history of post-traumatic stress syndrome, severe anxiety or history of psychosis?  N  [If yes, MAC]    19.   Do you transfer independently? (If NO, please HOSPITAL setting  only)  Y    20.  On a regular basis do you go 3-5 days between bowel movements? N   [ If yes, EXTENDED PREP.]    21.   Preferred LOCAL Pharmacy for Pre Prescription:                            [facility] [id number] [facility], [address]      Scheduling Details      Caller:   (Please ask for phone number if not scheduled by patient)    Type of Procedure Scheduled: Lower Endoscopy [Colonoscopy]    Which Colonoscopy Prep was Sent?: ESTEFANY MARTINEZ CF PATIENTS & GROEN'S PATIENTS NEEDS EXTENDED PREP    Surgeon: RUSSELL  Date of Procedure: 11/4  Location:     Sedation Type: CS    Conscious Sedation- Needs  for 6 hours after the procedure  MAC/General-Needs  for 24 hours after procedure    Pre-op Required at [facility] [place], [place] and OR for MAC sedation:  N  (advise patient they will need a pre-op WITH IN 30 DAYS prior to procedure -)      Informed patient they will need an adult  Y  Cannot take any type of public or medical transportation alone    Pre-Procedure Covid [facility] or Externally: HOME    Confirmed Nurse will call to complete assessment Y    Additional comments:

## 2022-08-10 ENCOUNTER — TRANSFERRED RECORDS (OUTPATIENT)
Dept: HEALTH INFORMATION MANAGEMENT | Facility: CLINIC | Age: 52
End: 2022-08-10

## 2022-09-17 ENCOUNTER — HOSPITAL ENCOUNTER (EMERGENCY)
Facility: CLINIC | Age: 52
Discharge: HOME | End: 2022-09-17
Attending: EMERGENCY MEDICINE | Admitting: EMERGENCY MEDICINE
Payer: COMMERCIAL

## 2022-09-17 ENCOUNTER — APPOINTMENT (OUTPATIENT)
Dept: ULTRASOUND IMAGING | Facility: CLINIC | Age: 52
End: 2022-09-17
Attending: EMERGENCY MEDICINE
Payer: COMMERCIAL

## 2022-09-17 VITALS
RESPIRATION RATE: 16 BRPM | OXYGEN SATURATION: 99 % | TEMPERATURE: 99 F | DIASTOLIC BLOOD PRESSURE: 96 MMHG | HEART RATE: 85 BPM | SYSTOLIC BLOOD PRESSURE: 149 MMHG

## 2022-09-17 DIAGNOSIS — M71.21 SYNOVIAL CYST OF RIGHT POPLITEAL SPACE: ICD-10-CM

## 2022-09-17 DIAGNOSIS — M79.661 RIGHT CALF PAIN: ICD-10-CM

## 2022-09-17 PROCEDURE — 93971 EXTREMITY STUDY: CPT | Mod: RT

## 2022-09-17 PROCEDURE — 99284 EMERGENCY DEPT VISIT MOD MDM: CPT | Mod: 25

## 2022-09-17 ASSESSMENT — ENCOUNTER SYMPTOMS
CHILLS: 0
MYALGIAS: 1
ARTHRALGIAS: 0
FEVER: 0
NUMBNESS: 0
SHORTNESS OF BREATH: 0
WEAKNESS: 0

## 2022-09-17 ASSESSMENT — ACTIVITIES OF DAILY LIVING (ADL)
ADLS_ACUITY_SCORE: 33
ADLS_ACUITY_SCORE: 35

## 2022-09-17 NOTE — ED PROVIDER NOTES
History   Chief Complaint:  Leg Pain and Leg Swelling     The history is provided by the patient and medical records.      Joaquin Dacosta is a 51 year old male with history of asthma and hyperlipidemia who presents with leg pain and swelling. The patient states he noted increased swelling and pain to his right lower extremity, Swelling is present from his right knee down. He has increased pain with flexion of his right leg and with weightbearing. 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 Denies trauma, injury, or laceration to his right leg. No numbness or weakness to his lower extremities. No fevers, chills, shortness of breath, or chest pain. No radiating groin pain. No arthralgias or rashes. No recent long-distance travel. No history of DVT/PE.     Review of Systems   Constitutional: Negative for chills and fever.   Respiratory: Negative for shortness of breath.    Cardiovascular: Positive for leg swelling. Negative for chest pain.   Musculoskeletal: Positive for myalgias. Negative for arthralgias.   Skin: Negative for rash.   Neurological: Negative for weakness and numbness.   All other systems reviewed and are negative.    Allergies:  The patient has no known allergies.     Medications:  Albuterol inhaler  Lipitor   Valtrex    Past Medical History:     Allergic rhinitis  Recurrent herpes labialis   Hyperlipidemia   Intermittent asthma      Past Surgical History:    Foot surgery  [place] teeth extraction      Family History:    Mother: Breast Cancer, Lipids, Hyperlipidemia   Father: Asthma, Hyperlipidemia    Social History:  The patient was unaccompanied to the emergency department.  Occupation: August   PCP: Lefty Chang     Physical Exam     Patient Vitals for the past 24 hrs:   BP Temp Temp src Pulse Resp SpO2   09/17/22 1026 (!) 149/96 99  F (37.2  C) Temporal 85 16 99 %     Physical Exam  Constitutional: Well appearing.  HEENT: Atraumatic. Moist mucous  membranes.  Cardiac: Regular rate and rhythm.  No murmur or rub.  Respiratory: Clear to auscultation bilaterally.  No respiratory distress.    Abdomen: Soft and nontender.  No rebound or guarding.  Nondistended.  Musculoskeletal: Mild edema of the right calf.  Tenderness of the superior medial calf with no fluctuance, erythema, or induration.  Pulses 2+ and symmetric.  Cap refill less than 3 seconds throughout the toes in the right foot. Normal range of motion.  Neurologic: Alert and oriented.  Normal tone and bulk. 5/5 strength in bilateral lower extremities.  Sensation to light touch intact throughout.  Skin: No rashes.  No edema.  Psych: Normal affect.  Normal behavior.              Emergency Department Course   Imaging:  US Lower Extremity Venous Duplex Right   Final Result   IMPRESSION:   1.  No deep venous thrombosis in the right lower extremity.   2.  Moderate size fluid collection in the upper medial posterior right calf may represent a Baker's cyst (possibly ruptured) or hematoma.      Report per radiology     Emergency Department Course:     Reviewed:  I reviewed nursing notes, vitals, past medical history and Care Everywhere    Assessments:  1041 I obtained history and examined the patient as noted above.   1314 I rechecked the patient and explained findings.     Disposition:  The patient was discharged to home.     Impression & Plan   Medical Decision Making:  Joaquin Dacosta is a 51-year-old man who is afebrile and hemodynamically stable.  Is neurovascular intact in the right leg.  Ultrasound of the leg demonstrated fluid collection in his area of pain where he felt a knot yesterday.  We discussed this likely represents a ruptured Baker cyst given his nature of work versus hematoma, however, he has not had any trauma and is not anticoagulated.  There is no evidence of acute ischemic limb or acute septic joint.  We discussed supportive care at home, NSAIDs, orthopedic or primary care follow-up and is in  agreement with this plan.  He was given precautions.  Return precautions were given.  He was no distress at time of discharge.    Diagnosis:    ICD-10-CM    1. Right calf pain  M79.661    2. Synovial cyst of right popliteal space  M71.21      Scribe Disclosure:  I, Angelique Alcantar, am serving as a scribe at 10:29 AM on 9/17/2022 to document services personally performed by Eder Hudson MD based on my observations and the provider's statements to me.     Eder Hudson MD  09/18/22 1018

## 2022-09-17 NOTE — ED TRIAGE NOTES
Sudden onset of right lower leg pain and swelling. Denies hx of blood clots- no recent long travel. Denies chest pain or SOB     Triage Assessment     Row Name 09/17/22 1025       Triage Assessment (Adult)    Airway WDL WDL       Respiratory WDL    Respiratory WDL WDL       Skin Circulation/Temperature WDL    Skin Circulation/Temperature WDL WDL       Cardiac WDL    Cardiac WDL WDL       Peripheral/Neurovascular WDL    Peripheral Neurovascular WDL X  right leg swelling

## 2022-10-03 ENCOUNTER — TRANSFERRED RECORDS (OUTPATIENT)
Dept: FAMILY MEDICINE | Facility: CLINIC | Age: 52
End: 2022-10-03

## 2022-10-03 LAB
ALT SERPL-CCNC: 76 IU/L (ref 0–44)
AST SERPL-CCNC: 27 IU/L (ref 0–40)
CHOLESTEROL (EXTERNAL): 241 MG/DL (ref 100–199)
CREATININE (EXTERNAL): 0.87 MG/DL (ref 0.76–1.27)
GFR ESTIMATED (EXTERNAL): 104 ML/MIN/1.73
GLUCOSE (EXTERNAL): 89 MG/DL (ref 70–99)
HDLC SERPL-MCNC: 81 MG/DL
HEP C HIM: NORMAL
LDL CHOLESTEROL CALCULATED (EXTERNAL): 130 MG/DL (ref 0–99)
POTASSIUM (EXTERNAL): 4.5 MMOL/L (ref 3.5–5.2)
TRIGLYCERIDES (EXTERNAL): 171 MG/DL (ref 0–149)

## 2022-10-25 ENCOUNTER — OFFICE VISIT (OUTPATIENT)
Dept: FAMILY MEDICINE | Facility: CLINIC | Age: 52
End: 2022-10-25
Payer: COMMERCIAL

## 2022-10-25 VITALS
SYSTOLIC BLOOD PRESSURE: 149 MMHG | OXYGEN SATURATION: 97 % | HEART RATE: 67 BPM | RESPIRATION RATE: 18 BRPM | BODY MASS INDEX: 26.76 KG/M2 | DIASTOLIC BLOOD PRESSURE: 102 MMHG | TEMPERATURE: 98.1 F | WEIGHT: 176 LBS

## 2022-10-25 DIAGNOSIS — R03.0 ELEVATED BP WITHOUT DIAGNOSIS OF HYPERTENSION: Primary | ICD-10-CM

## 2022-10-25 DIAGNOSIS — M71.20 SYNOVIAL CYST OF POPLITEAL SPACE, UNSPECIFIED LATERALITY: ICD-10-CM

## 2022-10-25 DIAGNOSIS — L40.0 PLAQUE PSORIASIS: ICD-10-CM

## 2022-10-25 LAB
ALBUMIN SERPL-MCNC: 3.8 G/DL (ref 3.4–5)
ALP SERPL-CCNC: 75 U/L (ref 40–150)
ALT SERPL W P-5'-P-CCNC: 50 U/L (ref 0–70)
AST SERPL W P-5'-P-CCNC: 21 U/L (ref 0–45)
BILIRUB DIRECT SERPL-MCNC: 0.2 MG/DL (ref 0–0.2)
BILIRUB SERPL-MCNC: 0.7 MG/DL (ref 0.2–1.3)
PROT SERPL-MCNC: 7.2 G/DL (ref 6.8–8.8)

## 2022-10-25 PROCEDURE — 90471 IMMUNIZATION ADMIN: CPT | Performed by: FAMILY MEDICINE

## 2022-10-25 PROCEDURE — 90682 RIV4 VACC RECOMBINANT DNA IM: CPT | Performed by: FAMILY MEDICINE

## 2022-10-25 PROCEDURE — 99213 OFFICE O/P EST LOW 20 MIN: CPT | Mod: 25 | Performed by: FAMILY MEDICINE

## 2022-10-25 PROCEDURE — 36415 COLL VENOUS BLD VENIPUNCTURE: CPT | Performed by: FAMILY MEDICINE

## 2022-10-25 PROCEDURE — 80076 HEPATIC FUNCTION PANEL: CPT | Performed by: FAMILY MEDICINE

## 2022-10-25 NOTE — PROGRESS NOTES
Assessment & Plan     Elevated BP without diagnosis of hypertension  Unclear etiology.  Recently started on new immunosuppressive drug for plaque psoriasis but I do not think is the primary culprit as per up-to-date review, I still recommended him to reach out to his Derm to clarify.  We agreed to recheck it in couple of months and if blood pressure is persistently high he needs antihypertensive medication.  We discussed silent nature of hypertension.  Continue to work on lifestyle meanwhile.    Plaque psoriasis  Seeing dermatologist.  She was advised to repeat some labs but I am not sure which labs are needed.  Per patient his liver enzymes were high.  We agreed to obtain it again.  - Hepatic panel (Albumin, ALT, AST, Bili, Alk Phos, TP); Future  - Hepatic panel (Albumin, ALT, AST, Bili, Alk Phos, TP)    Synovial cyst of popliteal space, unspecified laterality  Discussed common condition and its symptoms.  He has been to ortho.  Imaging of knee does not show osteoarthritis.  Continue to monitor and symptomatic care.     Return in about 6 weeks (around 12/6/2022).    Lefty Chang MD, MD  [facility]    Gennaro Nuñez is a 51 year old, presenting for the following health issues:  FU After ER Visit      History of Present Illness       Reason for visit:  Follow up after visit to ER for bkers cyst    He eats 2-3 servings of fruits and vegetables daily.He consumes 1 sweetened beverage(s) daily.He exercises with enough effort to increase his heart rate 9 or less minutes per day.  He exercises with enough effort to increase his heart rate 3 or less days per week.   He is taking medications regularly.     Was concerned about DVT with pulled muscle and US was negative.   Was found to have bakers cyst on right side.     Now on left side some symptoms too.     Saw ortho - did xrays and did not show OA both knee.     For psoriasis started seeing derm. New diagnosis - started new rx  through derm. Insurance also does not cover it. [facility] iTmothy Ernst. Also basal cell carcinoma on neck and needs mohs surgery. Scheduled.     Colonoscopy and sleep study scheduled.     Review of Systems         Objective    BP (!) 149/102 (BP Location: Right arm, Patient Position: Sitting, Cuff Size: Adult Regular)   Pulse 67   Temp 98.1  F (36.7  C) (Oral)   Resp 18   Wt 79.8 kg (176 lb)   SpO2 97%   BMI 26.76 kg/m    Body mass index is 26.76 kg/m .  Physical Exam

## 2022-10-26 NOTE — RESULT ENCOUNTER NOTE
Excellent! Please call or send a [employer] message if you have any questions. Fiona Villanueva M.D.

## 2022-11-04 ENCOUNTER — HOSPITAL ENCOUNTER (OUTPATIENT)
Facility: CLINIC | Age: 52
Discharge: HOME | End: 2022-11-04
Attending: INTERNAL MEDICINE | Admitting: INTERNAL MEDICINE
Payer: COMMERCIAL

## 2022-11-04 VITALS
DIASTOLIC BLOOD PRESSURE: 97 MMHG | BODY MASS INDEX: 25.99 KG/M2 | RESPIRATION RATE: 18 BRPM | HEIGHT: 69 IN | OXYGEN SATURATION: 97 % | HEART RATE: 68 BPM | SYSTOLIC BLOOD PRESSURE: 134 MMHG

## 2022-11-04 LAB — COLONOSCOPY: NORMAL

## 2022-11-04 PROCEDURE — 258N000003 HC RX IP 258 OP 636: Performed by: INTERNAL MEDICINE

## 2022-11-04 PROCEDURE — 88305 TISSUE EXAM BY PATHOLOGIST: CPT | Mod: 26 | Performed by: PATHOLOGY

## 2022-11-04 PROCEDURE — 99153 MOD SED SAME PHYS/QHP EA: CPT | Mod: PT | Performed by: INTERNAL MEDICINE

## 2022-11-04 PROCEDURE — 45385 COLONOSCOPY W/LESION REMOVAL: CPT | Mod: PT | Performed by: INTERNAL MEDICINE

## 2022-11-04 PROCEDURE — 88305 TISSUE EXAM BY PATHOLOGIST: CPT | Mod: TC | Performed by: INTERNAL MEDICINE

## 2022-11-04 PROCEDURE — 250N000011 HC RX IP 250 OP 636: Performed by: INTERNAL MEDICINE

## 2022-11-04 PROCEDURE — G0500 MOD SEDAT ENDO SERVICE >5YRS: HCPCS | Performed by: INTERNAL MEDICINE

## 2022-11-04 RX ORDER — ONDANSETRON 4 MG/1
4 TABLET, ORALLY DISINTEGRATING ORAL EVERY 6 HOURS PRN
Status: DISCONTINUED | OUTPATIENT
Start: 2022-11-04 | End: 2022-11-04 | Stop reason: HOSPADM

## 2022-11-04 RX ORDER — FENTANYL CITRATE 50 UG/ML
INJECTION, SOLUTION INTRAMUSCULAR; INTRAVENOUS PRN
Status: DISCONTINUED | OUTPATIENT
Start: 2022-11-04 | End: 2022-11-04 | Stop reason: HOSPADM

## 2022-11-04 RX ORDER — EPINEPHRINE 1 MG/ML
0.1 INJECTION, SOLUTION, CONCENTRATE INTRAVENOUS
Status: DISCONTINUED | OUTPATIENT
Start: 2022-11-04 | End: 2022-11-04 | Stop reason: HOSPADM

## 2022-11-04 RX ORDER — NALOXONE HYDROCHLORIDE 0.4 MG/ML
0.2 INJECTION, SOLUTION INTRAMUSCULAR; INTRAVENOUS; SUBCUTANEOUS
Status: DISCONTINUED | OUTPATIENT
Start: 2022-11-04 | End: 2022-11-04 | Stop reason: HOSPADM

## 2022-11-04 RX ORDER — NALOXONE HYDROCHLORIDE 0.4 MG/ML
0.4 INJECTION, SOLUTION INTRAMUSCULAR; INTRAVENOUS; SUBCUTANEOUS
Status: DISCONTINUED | OUTPATIENT
Start: 2022-11-04 | End: 2022-11-04 | Stop reason: HOSPADM

## 2022-11-04 RX ORDER — SIMETHICONE 40MG/0.6ML
133 SUSPENSION, DROPS(FINAL DOSAGE FORM)(ML) ORAL
Status: DISCONTINUED | OUTPATIENT
Start: 2022-11-04 | End: 2022-11-04 | Stop reason: HOSPADM

## 2022-11-04 RX ORDER — PROCHLORPERAZINE MALEATE 10 MG
10 TABLET ORAL EVERY 6 HOURS PRN
Status: DISCONTINUED | OUTPATIENT
Start: 2022-11-04 | End: 2022-11-04 | Stop reason: HOSPADM

## 2022-11-04 RX ORDER — FLUMAZENIL 0.1 MG/ML
0.2 INJECTION, SOLUTION INTRAVENOUS
Status: DISCONTINUED | OUTPATIENT
Start: 2022-11-04 | End: 2022-11-04 | Stop reason: HOSPADM

## 2022-11-04 RX ORDER — ATROPINE SULFATE 0.1 MG/ML
1 INJECTION INTRAVENOUS
Status: DISCONTINUED | OUTPATIENT
Start: 2022-11-04 | End: 2022-11-04 | Stop reason: HOSPADM

## 2022-11-04 RX ORDER — ONDANSETRON 2 MG/ML
4 INJECTION INTRAMUSCULAR; INTRAVENOUS EVERY 6 HOURS PRN
Status: DISCONTINUED | OUTPATIENT
Start: 2022-11-04 | End: 2022-11-04 | Stop reason: HOSPADM

## 2022-11-04 RX ORDER — ONDANSETRON 2 MG/ML
4 INJECTION INTRAMUSCULAR; INTRAVENOUS
Status: DISCONTINUED | OUTPATIENT
Start: 2022-11-04 | End: 2022-11-04 | Stop reason: HOSPADM

## 2022-11-04 RX ORDER — FENTANYL CITRATE 50 UG/ML
50-100 INJECTION, SOLUTION INTRAMUSCULAR; INTRAVENOUS EVERY 5 MIN PRN
Status: DISCONTINUED | OUTPATIENT
Start: 2022-11-04 | End: 2022-11-04 | Stop reason: HOSPADM

## 2022-11-04 RX ORDER — SODIUM CHLORIDE 9 MG/ML
INJECTION, SOLUTION INTRAVENOUS CONTINUOUS PRN
Status: DISCONTINUED | OUTPATIENT
Start: 2022-11-04 | End: 2022-11-04 | Stop reason: HOSPADM

## 2022-11-04 RX ORDER — DIPHENHYDRAMINE HYDROCHLORIDE 50 MG/ML
25-50 INJECTION INTRAMUSCULAR; INTRAVENOUS
Status: DISCONTINUED | OUTPATIENT
Start: 2022-11-04 | End: 2022-11-04 | Stop reason: HOSPADM

## 2022-11-04 RX ORDER — LIDOCAINE 40 MG/G
CREAM TOPICAL
Status: DISCONTINUED | OUTPATIENT
Start: 2022-11-04 | End: 2022-11-04 | Stop reason: HOSPADM

## 2022-11-04 ASSESSMENT — ACTIVITIES OF DAILY LIVING (ADL): ADLS_ACUITY_SCORE: 35

## 2022-11-04 NOTE — H&P
[facility] Anesthesia Pre-op History and Physical    Joaquin Dacosta MRN# [id number]   Age: 51 year old YOB: 1970      Date of Surgery: today [facility]      Date of Exam 11/4/2022 Facility (Same day)       Home clinic: unknown  Primary care provider: Lefty Chang         Chief Complaint and/or Reason for Procedure:   No chief complaint on file.           Active problem list:     Patient Active Problem List    Diagnosis Date Noted     Plaque psoriasis 10/25/2022     Priority: Medium     Intermittent asthma without complication      Priority: Medium             HYPERLIPIDEMIA LDL GOAL <130 05/09/2010     Priority: Medium     Recurrent herpes labialis 10/28/2008     Priority: Medium     Allergic rhinitis      Priority: Medium     Problem list name updated by automated process. Provider to review              Medications (include herbals and vitamins):   Any Plavix use in the last 7 days? No     Current Facility-Administered Medications   Medication     0.9% sodium chloride BOLUS     atropine injection 1 mg     benzocaine 20% (HURRICAINE/TOPEX) 20 % spray 0.5 mL     diphenhydrAMINE (BENADRYL) injection 25-50 mg     EPINEPHrine PF (ADRENALIN) injection 0.1 mg     fentaNYL (PF) (SUBLIMAZE) injection  mcg     flumazenil (ROMAZICON) injection 0.2 mg     glucagon injection 0.5 mg     midazolam (VERSED) injection 0.5-2 mg     naloxone (NARCAN) injection 0.2 mg    Or     naloxone (NARCAN) injection 0.4 mg    Or     naloxone (NARCAN) injection 0.2 mg    Or     naloxone (NARCAN) injection 0.4 mg     simethicone (MYLICON) suspension 133 mg     sodium chloride (PF) 0.9% PF flush 3 mL             Allergies:      Allergies   Allergen Reactions     No Known Drug Allergies      Allergy to Latex? No  Allergy to tape?   No  Intolerances: NKDA            Physical Exam:   All vitals have been reviewed  No data found.  No intake/output data recorded.  Airway assessment:    Patient is able to open mouth wide  Patient is able to stick out tongue}              Lab / Radiology Results:             Anesthetic risk and/or ASA classification:       Star Harrington MD

## 2022-11-07 LAB
PATH REPORT.COMMENTS IMP SPEC: NORMAL
PATH REPORT.COMMENTS IMP SPEC: NORMAL
PATH REPORT.FINAL DX SPEC: NORMAL
PATH REPORT.GROSS SPEC: NORMAL
PATH REPORT.MICROSCOPIC SPEC OTHER STN: NORMAL
PATH REPORT.RELEVANT HX SPEC: NORMAL
PHOTO IMAGE: NORMAL

## 2022-11-14 ENCOUNTER — OFFICE VISIT (OUTPATIENT)
Dept: SLEEP MEDICINE | Facility: CLINIC | Age: 52
End: 2022-11-14
Payer: COMMERCIAL

## 2022-11-14 VITALS
HEIGHT: 69 IN | WEIGHT: 175 LBS | OXYGEN SATURATION: 97 % | DIASTOLIC BLOOD PRESSURE: 109 MMHG | SYSTOLIC BLOOD PRESSURE: 154 MMHG | BODY MASS INDEX: 25.92 KG/M2 | HEART RATE: 82 BPM

## 2022-11-14 DIAGNOSIS — R29.818 SUSPECTED SLEEP APNEA: Primary | ICD-10-CM

## 2022-11-14 DIAGNOSIS — R03.0 ELEVATED BLOOD PRESSURE READING: ICD-10-CM

## 2022-11-14 DIAGNOSIS — R06.83 SNORING: ICD-10-CM

## 2022-11-14 DIAGNOSIS — R53.82 CHRONIC FATIGUE: ICD-10-CM

## 2022-11-14 PROCEDURE — 99203 OFFICE O/P NEW LOW 30 MIN: CPT | Performed by: INTERNAL MEDICINE

## 2022-11-14 NOTE — NURSING NOTE
"Chief Complaint   Patient presents with     Sleep Problem     Snoring, Not getting a good night of sleep       Initial BP (!) 154/109   Pulse 82   Ht 1.753 m (5' 9\")   Wt 79.4 kg (175 lb)   SpO2 97%   BMI 25.84 kg/m   Estimated body mass index is 25.84 kg/m  as calculated from the following:    Height as of this encounter: 1.753 m (5' 9\").    Weight as of this encounter: 79.4 kg (175 lb).    Medication Reconciliation: complete  ESS 5  Neck circumference: 44 centimeters.  Nuzhat Carvalho MA  "

## 2022-11-14 NOTE — PROGRESS NOTES
Sleep Consultation:    Date on this visit: 11/14/2022    Joaquin Dacosta  is referred by Ankush Mattson for a sleep consultation.     Primary Physician: Lefty Chang     Joaquin Dacosta reports nightly snoring and poor quality of sleep and excessive daytime fatigue for many years.     Joaquin does snore every night. Patient's bed partner does complain of snoring and snorting. He does not have witnessed apneas.They always sleep separately.  Patient sleeps on his side. He has occasional morning dry mouth, denies no morning headaches and restless legs. Joaquin denies any sleep walking, dream enactment, sleep paralysis and hypnogogic/hypnopompic hallucinations. His dentist has told him that he has signs of bruxism.     Joaquin goes to sleep at 9:00 PM during the week. He wakes up at 6:00 AM. He falls asleep in 10 minutes.  Joaquin denies difficulty falling asleep.  He wakes up 2-4 times a night for 30 minutes before falling back to sleep.  Joaquin wakes up to go to the bathroom and uncertain reasons.  On weekends, Joaquin goes to sleep at 10:00 PM.  He wakes up at 7:00 AM. He falls asleep in 10 minutes.  Patient gets an average of 7 hours of sleep per night.    Joaquin has difficulty breathing through his nose.      Patient's Yale Sleepiness score 5/24 consistent with no daytime sleepiness.      Joaquin naps 1 times per week for 30-60 minutes, feels refreshed after naps. He takes no inadvertant naps.  He denies closing eyes, dozing and falling asleep while driving. Patient was counseled on the importance of driving while alert, to pull over if drowsy, or nap before getting into the vehicle if sleepy.      He uses 2-3 cups/day of coffee. Last caffeine intake is usually before noon.    Past Medical/Surgical History:  Past Medical History:   Diagnosis Date     Allergic rhinitis, cause unspecified      Arthritis     fingers     Cancer (H)     basal cell carcinoma-neck     Hyperlipidemia LDL goal <130      Moderate  "persistent asthma      Psoriasis      Recurrent herpes labialis 10/28/2008    Cold Sores - Takes Valtrex .prn     Social History     Tobacco Use     Smoking status: Former     Smokeless tobacco: Never     Tobacco comments:     tried in  college   Vaping Use     Vaping Use: Never used   Substance Use Topics     Alcohol use: Yes     Comment: 2 drinks of with dinner     Drug use: No     Family History:     Brother has sleep apnea.     Physical Examination:  Vitals: BP (!) 154/109   Pulse 82   Ht 1.753 m (5' 9\")   Wt 79.4 kg (175 lb)   SpO2 97%   BMI 25.84 kg/m    BMI= Body mass index is 25.84 kg/m .  GENERAL APPEARANCE: healthy, alert and no distress  HENT: oropharynx crowded  NEURO: mentation intact and speech normal  PSYCH: mentation appears normal and affect normal/bright  Mallampati Class: IV.  Tonsillar Stage: 1  hidden by pillars.    Impression/Plan:    1. Probable obstructive sleep apnea  2. Chronic fatigue  3. Elevated blood pressure     Patient is a 51 years old male, BMI of 25, who presents with history of loud snoring, non restorative sleep and excessive daytime fatigue. He and his wife sleep separately, and we do not have accurate assessment of whether rhe has witnessed apneas. He has been noted to have elevated blood pressure recordings recently. There is an intermediate risk for sleep apnea and an overnight sleep study is recommended for assessment.     Plan:    1.  Home sleep apnea testing       He will follow up with me in approximately two weeks after his sleep study has been competed to review the results and discuss plan of care.       Polysomnography & HST reviewed.  Limitations of HST reviewed.   Obstructive sleep apnea reviewed.  Complications of untreated sleep apnea were reviewed.      Dr. Epifanio Ramirez     CC: Ankush Mattson            "

## 2022-12-05 ENCOUNTER — OFFICE VISIT (OUTPATIENT)
Dept: SLEEP MEDICINE | Facility: CLINIC | Age: 52
End: 2022-12-05
Payer: COMMERCIAL

## 2022-12-05 DIAGNOSIS — R53.82 CHRONIC FATIGUE: ICD-10-CM

## 2022-12-05 DIAGNOSIS — R03.0 ELEVATED BLOOD PRESSURE READING: ICD-10-CM

## 2022-12-05 DIAGNOSIS — R06.83 SNORING: ICD-10-CM

## 2022-12-05 DIAGNOSIS — R29.818 SUSPECTED SLEEP APNEA: ICD-10-CM

## 2022-12-05 PROCEDURE — G0399 HOME SLEEP TEST/TYPE 3 PORTA: HCPCS | Performed by: INTERNAL MEDICINE

## 2022-12-05 NOTE — PROGRESS NOTES
Pt is completing a home sleep test. Pt was instructed on how to put on the Noxturnal T3 device and associated equipment before going to bed and given the opportunity to practice putting it on before leaving the sleep center. Pt was reminded to bring the home sleep test kit back to the center tomorrow, at agreed upon time for download and reporting.   Tamika Blair CMA on 12/5/2022 at 12:47 PM

## 2022-12-06 ENCOUNTER — DOCUMENTATION ONLY (OUTPATIENT)
Dept: SLEEP MEDICINE | Facility: CLINIC | Age: 52
End: 2022-12-06

## 2022-12-06 NOTE — NURSING NOTE
Pt returned HST device. It was downloaded and forwarded data to the clinical specialist for scoring.  Tamika Blair CMA on 12/6/2022 at 9:26 AM

## 2022-12-06 NOTE — PROGRESS NOTES
This HSAT was performed using a Noxturnal T3 device which recorded snore, sound, movement activity, body position, nasal pressure, oronasal thermal airflow, pulse, oximetry and both chest and abdominal respiratory effort. HSAT data was restricted to the time patient states they were in bed.     HSAT was scored using 1B 4% hypopnea rule.     HST AHI (Non-PAT): 25.2  Snoring was reported as loud.  Time with SpO2 below 89% was 9.1 minutes.   Overall signal quality was good.     Pt will follow up with sleep provider to determine appropriate therapy.

## 2022-12-06 NOTE — PROGRESS NOTES
HST POST-STUDY QUESTIONNAIRE    1. What time did you go to bed?  10pm  2. How long do you think it took to fall asleep?  10-15 mins  3. What time did you wake up to start the day?  7:30am  4. Did you get up during the night at all?  yes  5. If you woke up, do you remember approximately what time(s)? 6:30 to use bathroom  6. Did you have any difficulty with the equipment?  No  7. Did you us any type of treatment with this study?  None  8. Was the head of the bed elevated? No  9. Did you sleep in a recliner?  No  10. Did you stop using CPAP at least 3 days before this test?  NA  11. Any other information you'd like us to know?

## 2022-12-07 ENCOUNTER — ALLIED HEALTH/NURSE VISIT (OUTPATIENT)
Dept: FAMILY MEDICINE | Facility: CLINIC | Age: 52
End: 2022-12-07
Payer: COMMERCIAL

## 2022-12-07 DIAGNOSIS — Z01.30 BP CHECK: Primary | ICD-10-CM

## 2022-12-07 PROCEDURE — 99207 PR NO CHARGE NURSE ONLY: CPT

## 2022-12-07 NOTE — PROGRESS NOTES
Joaquin Dacosta is a 52 year old year old patient who comes in today for a Blood Pressure check because of ongoing blood pressure monitoring.  Vital Signs as repeated by /92, 142/90.  Patient is taking medication as prescribed  Patient is tolerating medications well.  Patient is not monitoring Blood Pressure at home.  Average readings if yes are noted high blood pressure in ER and sleep specialist.  Current complaints: none  Disposition:  patient instructed to f/u in one month to check BP. BP was borderline on 2nd reading. Pt will work on lifestyle changes- dicussed diet and exercise.    I forgot to chart the bp on the vs list.  I updated this on 12/9/22.  MATTEO Cárdenas

## 2022-12-07 NOTE — PROCEDURES
"HOME SLEEP STUDY INTERPRETATION        Patient: Joaquin Dacosta  MRN: [id number]  YOB: 1970  Study Date: 2022  PCP/Referring Provider: Letfy Chang;   Ordering Provider: Epifanio Ramirez MD         Indications for Home Study: Joaquin Dacosta is a 52 year old male who presents with symptoms suggestive of obstructive sleep apnea.    Estimated body mass index is 25.84 kg/m  as calculated from the following:    Height as of 22: 1.753 m (5' 9\").    Weight as of 22: 79.4 kg (175 lb).  Total score - [place]: 5 (2022  9:00 AM)  STOP-BAN/8        Data: A full night home sleep study was performed recording the standard physiologic parameters including body position, movement, sound, nasal pressure, thermal oral airflow, chest and abdominal movements with respiratory inductance plethysmography, and oxygen saturation by pulse oximetry. Pulse rate was estimated by oximetry recording. This study was considered adequate based on > 4 hours of quality oximetry and respiratory recording. As specified by the AASM Manual for the Scoring of Sleep and Associated events, version 2.3, Rule VIII.D 1B, 4% oxygen desaturation scoring for hypopneas is used as a standard of care on all home sleep apnea testing.        Analysis Time:  579.9 minutes        Respiration:   Sleep Associated Hypoxemia: sustained hypoxemia was present. Baseline oxygen saturation was 93%.  Time with saturation less than or equal to 88% was 9 minutes. The lowest oxygen saturation was 82%.   Snoring: Snoring was present.  Respiratory events: The home study revealed a presence of 110 obstructive apneas and 2 mixed and central apneas. There were 129 hypopneas resulting in a combined apnea/hypopnea index [AHI] of 25.2 events per hour.  AHI was 34.7 per hour supine, N/A per hour prone, 33.6 per hour on left side, and 12.4 per hour on right side.   Pattern: Excluding events noted above, respiratory rate and pattern was " Normal.      Position: Percent of time spent: supine - 29.2%, prone - 0%, on left - 28.9%, on right - 41%.      Heart Rate: By pulse oximetry normal rate was noted.       Assessment:     Moderate obstructive sleep apnea.    Sleep associated hypoxemia was present.    Recommendations:    CPAP therapy is the treatment of choice for moderate obstructive sleep apnea.  Treatment can be initiated with auto titrating CPAP therapy with a pressure range of 5 to 15 cm H2O, with appropriate clinical follow-up in 1 to 2 months to monitor therapy.    If CPAP is not tolerated or accepted, patient can consider oral appliance therapy through referral to dental sleep medicine.      Diagnosis Code(s): Obstructive Sleep Apnea G47.33, Hypoxemia G47.36    Epifanio Ramirez MD, December 7, 2022   Diplomate, American Board of Psychiatry and Neurology, Sleep Medicine

## 2022-12-09 VITALS — SYSTOLIC BLOOD PRESSURE: 142 MMHG | DIASTOLIC BLOOD PRESSURE: 90 MMHG

## 2023-01-12 ENCOUNTER — ALLIED HEALTH/NURSE VISIT (OUTPATIENT)
Dept: FAMILY MEDICINE | Facility: CLINIC | Age: 53
End: 2023-01-12
Payer: COMMERCIAL

## 2023-01-12 VITALS — SYSTOLIC BLOOD PRESSURE: 140 MMHG | DIASTOLIC BLOOD PRESSURE: 90 MMHG

## 2023-01-12 DIAGNOSIS — Z01.30 BP CHECK: Primary | ICD-10-CM

## 2023-01-12 PROCEDURE — 99207 PR NO CHARGE NURSE ONLY: CPT

## 2023-02-15 ENCOUNTER — OFFICE VISIT (OUTPATIENT)
Dept: FAMILY MEDICINE | Facility: CLINIC | Age: 53
End: 2023-02-15
Payer: COMMERCIAL

## 2023-02-15 VITALS
OXYGEN SATURATION: 99 % | WEIGHT: 179 LBS | RESPIRATION RATE: 16 BRPM | TEMPERATURE: 98.2 F | SYSTOLIC BLOOD PRESSURE: 151 MMHG | HEART RATE: 70 BPM | DIASTOLIC BLOOD PRESSURE: 101 MMHG | HEIGHT: 69 IN | BODY MASS INDEX: 26.51 KG/M2

## 2023-02-15 DIAGNOSIS — I10 ESSENTIAL HYPERTENSION: Primary | ICD-10-CM

## 2023-02-15 LAB
ANION GAP SERPL CALCULATED.3IONS-SCNC: 12 MMOL/L (ref 7–15)
BUN SERPL-MCNC: 13.9 MG/DL (ref 6–20)
CALCIUM SERPL-MCNC: 10.1 MG/DL (ref 8.6–10)
CHLORIDE SERPL-SCNC: 101 MMOL/L (ref 98–107)
CREAT SERPL-MCNC: 0.93 MG/DL (ref 0.67–1.17)
CREAT UR-MCNC: 96.2 MG/DL
DEPRECATED HCO3 PLAS-SCNC: 26 MMOL/L (ref 22–29)
GFR SERPL CREATININE-BSD FRML MDRD: >90 ML/MIN/1.73M2
GLUCOSE SERPL-MCNC: 100 MG/DL (ref 70–99)
MICROALBUMIN UR-MCNC: <12 MG/L
MICROALBUMIN/CREAT UR: NORMAL MG/G{CREAT}
POTASSIUM SERPL-SCNC: 4.7 MMOL/L (ref 3.4–5.3)
SODIUM SERPL-SCNC: 139 MMOL/L (ref 136–145)

## 2023-02-15 PROCEDURE — 99214 OFFICE O/P EST MOD 30 MIN: CPT | Performed by: FAMILY MEDICINE

## 2023-02-15 PROCEDURE — 93000 ELECTROCARDIOGRAM COMPLETE: CPT | Performed by: FAMILY MEDICINE

## 2023-02-15 PROCEDURE — 82043 UR ALBUMIN QUANTITATIVE: CPT | Performed by: FAMILY MEDICINE

## 2023-02-15 PROCEDURE — 36415 COLL VENOUS BLD VENIPUNCTURE: CPT | Performed by: FAMILY MEDICINE

## 2023-02-15 PROCEDURE — 80048 BASIC METABOLIC PNL TOTAL CA: CPT | Performed by: FAMILY MEDICINE

## 2023-02-15 PROCEDURE — 82570 ASSAY OF URINE CREATININE: CPT | Performed by: FAMILY MEDICINE

## 2023-02-15 RX ORDER — LISINOPRIL 20 MG/1
TABLET ORAL
Qty: 85 TABLET | Refills: 0 | Status: SHIPPED | OUTPATIENT
Start: 2023-02-15 | End: 2023-04-11

## 2023-02-15 RX ORDER — RISANKIZUMAB-RZAA 150 MG/ML
150 INJECTION SUBCUTANEOUS
Refills: 0 | COMMUNITY
Start: 2023-02-15 | End: 2024-02-02

## 2023-02-15 ASSESSMENT — ASTHMA QUESTIONNAIRES
ACT_TOTALSCORE: 24
ACT_TOTALSCORE: 24
QUESTION_4 LAST FOUR WEEKS HOW OFTEN HAVE YOU USED YOUR RESCUE INHALER OR NEBULIZER MEDICATION (SUCH AS ALBUTEROL): NOT AT ALL
QUESTION_1 LAST FOUR WEEKS HOW MUCH OF THE TIME DID YOUR ASTHMA KEEP YOU FROM GETTING AS MUCH DONE AT WORK, SCHOOL OR AT HOME: NONE OF THE TIME
QUESTION_5 LAST FOUR WEEKS HOW WOULD YOU RATE YOUR ASTHMA CONTROL: COMPLETELY CONTROLLED
QUESTION_3 LAST FOUR WEEKS HOW OFTEN DID YOUR ASTHMA SYMPTOMS (WHEEZING, COUGHING, SHORTNESS OF BREATH, CHEST TIGHTNESS OR PAIN) WAKE YOU UP AT NIGHT OR EARLIER THAN USUAL IN THE MORNING: NOT AT ALL
QUESTION_2 LAST FOUR WEEKS HOW OFTEN HAVE YOU HAD SHORTNESS OF BREATH: ONCE OR TWICE A WEEK

## 2023-02-15 NOTE — PROGRESS NOTES
"  Assessment & Plan     Essential hypertension  Persistently elevated bp, family history also. We discussed silent nature of HTN and its complications. Baseline labs, EKG today. Discussed lisinopril, hydrochlorothiazide and amlodipine. Side effects discussed. Agreed to start on lisinpril. Start with 10 and go up to 20mg. If tolerating it well and if comfortable monitoring at home - ok to go up to 40mg dose as per home readings. Written instructions given. He will notify me about his home readings.   - EKG 12-lead complete w/read - Clinics  - Basic metabolic panel  (Ca, Cl, CO2, Creat, Gluc, K, Na, BUN); Future  - Albumin Random Urine Quantitative with Creat Ratio; Future  - lisinopril (ZESTRIL) 20 MG tablet; Take 0.5 tablets (10 mg) by mouth daily for 10 days, THEN 1 tablet (20 mg) daily for 80 days.  - Basic metabolic panel  (Ca, Cl, CO2, Creat, Gluc, K, Na, BUN)  - Albumin Random Urine Quantitative with Creat Ratio             BMI:   Estimated body mass index is 26.51 kg/m  as calculated from the following:    Height as of this encounter: 1.75 m (5' 8.9\").    Weight as of this encounter: 81.2 kg (179 lb).           No follow-ups on file.    Lefty Chang MD, MD  [facility]    Gennaro Nuñez is a 52 year old, presenting for the following health issues:  Hypertension and Follow Up      History of Present Illness       Hypertension: He presents for follow up of hypertension.  He does not check blood pressure  regularly outside of the clinic. Outside blood pressures have been over 140/90. He does not follow a low salt diet.       Started using skyrizi and it is working well for psoriasis. [facility].   As per dermatologist it is not the cause of worsening of HTN.    Brother younger than him started HTN medication.   Mother took HTN meds but may be not anymore.    Snoring and concern of sleep apnea. Did home sleep study.     Does not check bp at home.     Review of " "Systems         Objective    BP (!) 151/101 (BP Location: Right arm, Patient Position: Sitting, Cuff Size: Adult Regular)   Pulse 70   Temp 98.2  F (36.8  C) (Temporal)   Resp 16   Ht 1.75 m (5' 8.9\")   Wt 81.2 kg (179 lb)   SpO2 99%   BMI 26.51 kg/m    Body mass index is 26.51 kg/m .  Physical Exam                       "

## 2023-02-15 NOTE — PATIENT INSTRUCTIONS
Home blood pressure goal is below 140/90.   Meditation, breathing technique.     Start with 10mg of lisinopril - check bp daily. If after a week - bp is above 140/90 - increase it to full pill (20mg per day).    If still above goal in a month - go up to 1.5 pills and then up to 2 pills (40mg) per day.     Send me a Weesh message with your home bp in 1-2 months.    =======================================  FYI:     System will autorelease results as soon as they are available. I wait for all results to be ready before sending you a comment or message.  Be assured I will review and comment on all of your results as soon as I can.     I am at [facility]  ([phone]) usually Monday, Tuesday and Wednesday.   Messages, evisits, phone calls received on Thursday and Friday may not get responded very urgently but I will try to respond as soon as possible.     2) My schedule has been booking out far in advance.  I apologize for the lack of timely access.  If you need to be seen for a chronic condition or preventive (wellness) visit, please be sure to schedule that appointment 2-3 months in advance.  If you have a concern that you feel cannot wait until my next available appointment (such as a hospital follow-up or new symptom of concern) please ask to speak to one of the [facility] nurses who may be able to access a sooner appointment.      You can schedule a video visit for follow-up appointments as well as future appointments for certain conditions.  Please see the below link.     Video Visits ([email or web address])     If you have not already done so,  I encourage you to sign up for [facility] [email or web address]  This will allow you to review your results, securely communicate with a provider, and schedule virtual visits as well.

## 2023-03-01 ENCOUNTER — TELEPHONE (OUTPATIENT)
Dept: SLEEP MEDICINE | Facility: CLINIC | Age: 53
End: 2023-03-01
Payer: COMMERCIAL

## 2023-03-01 NOTE — TELEPHONE ENCOUNTER
General Call    Contacts       Type Contact Phone/Fax    03/01/2023 12:41 PM CST Phone (Incoming) Joaquin Dacosta (Self) [phone] (H)        Reason for Call:  Patient just saw the [employer] message from Dr Ramirez and would like a treatment plan prior to his return visit on 3/13/23.     What are your questions or concerns:  Not getting any sleep    Date of last appointment with provider: HST was on 12/6/22    Could we send this information to you in [place] or would you prefer to receive a phone call?:   Patient would like to be contacted via [place]

## 2023-03-02 NOTE — TELEPHONE ENCOUNTER
Ariadna 12/7/22:     Your home sleep test shows that you have obstructive sleep apnea with sleep apnea episodes occurring at a rate of 25 times per hour of your sleep.  This falls in the moderate sleep apnea range.  CPAP therapy will be the most effective treatment for your sleep apnea, and alternative will be a dental device.  Let me know if you want to plan treatment prior to your scheduled follow-up.      Left message for patient call back to nurse at [phone] to clarify which treatment he would like to pursue.       Naya DIAZ RN  [facility]

## 2023-03-02 NOTE — TELEPHONE ENCOUNTER
At this point, since there is a follow up on 31/3, let him know that we can discuss in detail during the upcoming appointment.     Thanks

## 2023-03-02 NOTE — TELEPHONE ENCOUNTER
Patient updated. Agrees with plan. Will discuss options with provider at upcoming visit.     Naya DIAZ RN  [facility]

## 2023-03-13 ENCOUNTER — OFFICE VISIT (OUTPATIENT)
Dept: SLEEP MEDICINE | Facility: CLINIC | Age: 53
End: 2023-03-13
Payer: COMMERCIAL

## 2023-03-13 VITALS
DIASTOLIC BLOOD PRESSURE: 80 MMHG | HEART RATE: 80 BPM | BODY MASS INDEX: 26.96 KG/M2 | HEIGHT: 69 IN | OXYGEN SATURATION: 99 % | RESPIRATION RATE: 78 BRPM | WEIGHT: 182 LBS | SYSTOLIC BLOOD PRESSURE: 132 MMHG

## 2023-03-13 DIAGNOSIS — G47.33 OSA (OBSTRUCTIVE SLEEP APNEA): Primary | ICD-10-CM

## 2023-03-13 DIAGNOSIS — F51.04 INSOMNIA, PSYCHOPHYSIOLOGICAL: ICD-10-CM

## 2023-03-13 DIAGNOSIS — G47.34 SLEEP RELATED HYPOXIA: ICD-10-CM

## 2023-03-13 PROCEDURE — 99214 OFFICE O/P EST MOD 30 MIN: CPT | Performed by: INTERNAL MEDICINE

## 2023-03-13 NOTE — NURSING NOTE
"Chief Complaint   Patient presents with     Study Results     HST f/u       Initial /80   Pulse 80   Resp (!) 78   Ht 1.753 m (5' 9\")   Wt 82.6 kg (182 lb)   SpO2 99%   BMI 26.88 kg/m   Estimated body mass index is 26.88 kg/m  as calculated from the following:    Height as of this encounter: 1.753 m (5' 9\").    Weight as of this encounter: 82.6 kg (182 lb).    Medication Reconciliation: complete  ESS 8  Nuzhat Carvalho MA  "

## 2023-03-13 NOTE — PROGRESS NOTES
Sleep Study Follow-Up Visit:    Date on this visit: 3/13/2023    Joaquin Dacosta comes in today for follow-up of his home sleep study done on 12/05/2022 at the [facility] for possible sleep apnea.    Analysis Time:  579.9 minutes        Respiration:   Sleep Associated Hypoxemia: sustained hypoxemia was present. Baseline oxygen saturation was 93%.  Time with saturation less than or equal to 88% was 9 minutes. The lowest oxygen saturation was 82%.   Snoring: Snoring was present.  Respiratory events: The home study revealed a presence of 110 obstructive apneas and 2 mixed and central apneas. There were 129 hypopneas resulting in a combined apnea/hypopnea index [AHI] of 25.2 events per hour.  AHI was 34.7 per hour supine, N/A per hour prone, 33.6 per hour on left side, and 12.4 per hour on right side.   Pattern: Excluding events noted above, respiratory rate and pattern was Normal.        Position: Percent of time spent: supine - 29.2%, prone - 0%, on left - 28.9%, on right - 41%.        Heart Rate: By pulse oximetry normal rate was noted.         Assessment:     Moderate obstructive sleep apnea.    Sleep associated hypoxemia was present.    These findings were reviewed with patient.     Past medical/surgical history, family history, social history, medications and allergies were reviewed.      Impression/Plan:    1.  Moderate obstructive sleep apnea  2.  Sleep associated hypoxemia  3.  Insomnia, psychophysiologic     Home sleep test result was reviewed in detail.  We discussed moderate obstructive sleep apnea, consequences of untreated disease and management options.  Patient complains of nonrestorative sleep and excessive daytime fatigue.  He has tried an over-the-counter dental appliance which was not tolerated.  He opts for CPAP therapy.    We also reviewed his insomnia symptoms.  Currently, patient is taking an over-the-counter supplement with melatonin 10 mg.  Insomnia is perpetuated by prolonged  period of time spent in bed and tendency to worry in bed.  We discussed better stimulus control.  I also suggested that he take a lower dose of melatonin to see if there is any residual sedation from the sleep aid.    Plan:     1.  Start auto titrating CPAP therapy with a pressure range of 5 to 15 cm H2O  2.  Follow-up in 2 to 3 months after starting treatment to monitor response      Dr. Epifanio Ramirez     CC: Lefty Chang

## 2023-04-11 ENCOUNTER — OFFICE VISIT (OUTPATIENT)
Dept: FAMILY MEDICINE | Facility: CLINIC | Age: 53
End: 2023-04-11
Payer: COMMERCIAL

## 2023-04-11 VITALS
WEIGHT: 175.4 LBS | OXYGEN SATURATION: 99 % | DIASTOLIC BLOOD PRESSURE: 90 MMHG | BODY MASS INDEX: 25.98 KG/M2 | RESPIRATION RATE: 16 BRPM | HEIGHT: 69 IN | SYSTOLIC BLOOD PRESSURE: 120 MMHG | HEART RATE: 87 BPM | TEMPERATURE: 98.8 F

## 2023-04-11 DIAGNOSIS — I10 ESSENTIAL HYPERTENSION: Primary | ICD-10-CM

## 2023-04-11 DIAGNOSIS — E83.52 SERUM CALCIUM ELEVATED: ICD-10-CM

## 2023-04-11 LAB
ALBUMIN SERPL BCG-MCNC: 5 G/DL (ref 3.5–5.2)
ALP SERPL-CCNC: 63 U/L (ref 40–129)
ALT SERPL W P-5'-P-CCNC: 38 U/L (ref 10–50)
ANION GAP SERPL CALCULATED.3IONS-SCNC: 16 MMOL/L (ref 7–15)
AST SERPL W P-5'-P-CCNC: 35 U/L (ref 10–50)
BILIRUB SERPL-MCNC: 0.7 MG/DL
BUN SERPL-MCNC: 21.7 MG/DL (ref 6–20)
CA-I BLD-MCNC: 5.1 MG/DL (ref 4.4–5.2)
CALCIUM SERPL-MCNC: 10.6 MG/DL (ref 8.6–10)
CHLORIDE SERPL-SCNC: 106 MMOL/L (ref 98–107)
CREAT SERPL-MCNC: 1.08 MG/DL (ref 0.67–1.17)
DEPRECATED HCO3 PLAS-SCNC: 22 MMOL/L (ref 22–29)
GFR SERPL CREATININE-BSD FRML MDRD: 82 ML/MIN/1.73M2
GLUCOSE SERPL-MCNC: 86 MG/DL (ref 70–99)
POTASSIUM SERPL-SCNC: 4.6 MMOL/L (ref 3.4–5.3)
PROT SERPL-MCNC: 8.2 G/DL (ref 6.4–8.3)
SODIUM SERPL-SCNC: 144 MMOL/L (ref 136–145)

## 2023-04-11 PROCEDURE — 36415 COLL VENOUS BLD VENIPUNCTURE: CPT | Performed by: FAMILY MEDICINE

## 2023-04-11 PROCEDURE — 99214 OFFICE O/P EST MOD 30 MIN: CPT | Performed by: FAMILY MEDICINE

## 2023-04-11 PROCEDURE — 80053 COMPREHEN METABOLIC PANEL: CPT | Performed by: FAMILY MEDICINE

## 2023-04-11 PROCEDURE — 82330 ASSAY OF CALCIUM: CPT | Performed by: FAMILY MEDICINE

## 2023-04-11 RX ORDER — LISINOPRIL 40 MG/1
40 TABLET ORAL DAILY
Qty: 90 TABLET | Refills: 1 | Status: SHIPPED | OUTPATIENT
Start: 2023-04-11 | End: 2023-06-13

## 2023-04-11 ASSESSMENT — PAIN SCALES - GENERAL: PAINLEVEL: NO PAIN (0)

## 2023-04-11 NOTE — PROGRESS NOTES
"  Assessment & Plan     Essential hypertension  Blood pressure just above goal for diastolic.  Home blood pressure readings are overall OK.  He will be getting treatment for sleep apnea which I believe would help his blood pressure too.  At this point we agreed not to add another agent but if his blood pressure is still staying above 140/90 -I will add amlodipine 5 mg for him.  He understood.  He will notify me via MyChart his average blood pressure readings in the morning.  - lisinopril (ZESTRIL) 40 MG tablet; Take 1 tablet (40 mg) by mouth daily    Serum calcium elevated  No red flag symptoms.  We will do ionized calcium.  Further work-up if needed.  - Ionized Calcium; Future  - Comprehensive metabolic panel (BMP + Alb, Alk Phos, ALT, AST, Total. Bili, TP); Future  - Ionized Calcium  - Comprehensive metabolic panel (BMP + Alb, Alk Phos, ALT, AST, Total. Bili, TP)             BMI:   Estimated body mass index is 25.9 kg/m  as calculated from the following:    Height as of this encounter: 1.753 m (5' 9\").    Weight as of this encounter: 79.6 kg (175 lb 6.4 oz).   Weight management plan: Discussed healthy diet and exercise guidelines         Lefty Chang MD, MD  [facility]    Gennaro Nuñez is a 52 year old, presenting for the following health issues:  Hypertension and Recheck Medication        4/11/2023     3:07 PM   Additional Questions   Roomed by Anabel Marte     History of Present Illness       Hypertension: He presents for follow up of hypertension.  He does check blood pressure  regularly outside of the clinic. Outside blood pressures have been over 140/90. He does not follow a low salt diet.     He eats 2-3 servings of fruits and vegetables daily.He consumes 1 sweetened beverage(s) daily.He exercises with enough effort to increase his heart rate 9 or less minutes per day.  He exercises with enough effort to increase his heart rate 3 or less days per week.   He is taking " "medications regularly.     On 40mg of lisinopril right now. Gradually went up on the dose.      Checking bp at home and average is 138/94.     On Friday - will be getting fitted for cpap machine.     Also home getting remodeled.     Had some gag reflux - keeps tums. On and off symptoms. None for couple of days.       Review of Systems         Objective    BP (!) 120/90 (BP Location: Right arm, Patient Position: Sitting, Cuff Size: Adult Regular)   Pulse 87   Temp 98.8  F (37.1  C) (Temporal)   Resp 16   Ht 1.753 m (5' 9\")   Wt 79.6 kg (175 lb 6.4 oz)   SpO2 99%   BMI 25.90 kg/m    Body mass index is 25.9 kg/m .  Physical Exam                       "

## 2023-04-11 NOTE — PATIENT INSTRUCTIONS
Omeprazole (prilosec)20mg every morning for next 2 weeks.    Sleep apnea treatment helps with bp too.     Send me [employer] message with result average in a month or so - goal is below 139/89.    No dose adjustment of your medication.     =======================================  FYI:     System will autorelease results as soon as they are available. I wait for all results to be ready before sending you a comment or message.  Be assured I will review and comment on all of your results as soon as I can.     I am at [facility]  ([phone]) usually Monday, Tuesday and Wednesday.   Messages, evisits, phone calls received on Thursday and Friday may not get responded very urgently but I will try to respond as soon as possible.     2) My schedule sometime been booking out far in advance.  I apologize for the lack of timely access.  If you need to be seen for a chronic condition or preventive (wellness) visit, please be sure to schedule that appointment few months in advance.  If you have a concern that you feel cannot wait until my next available appointment (such as a hospital follow-up or new symptom of concern) please ask to speak to one of the [facility] nurses who may be able to access a sooner appointment.      You can schedule a video visit for follow-up appointments as well as future appointments for certain conditions.  Please see the below link.     Video Visits ([email or web address])     If you have not already done so,  I encourage you to sign up for [email or web address] ([email or web address]).  This will allow you to review your results, securely communicate with a provider, and schedule virtual visits as well.

## 2023-04-14 ENCOUNTER — DOCUMENTATION ONLY (OUTPATIENT)
Dept: SLEEP MEDICINE | Facility: CLINIC | Age: 53
End: 2023-04-14
Payer: COMMERCIAL

## 2023-04-14 DIAGNOSIS — G47.33 OBSTRUCTIVE SLEEP APNEA (ADULT) (PEDIATRIC): Primary | ICD-10-CM

## 2023-04-14 NOTE — PROGRESS NOTES
Patient was offered choice of vendor and chose [facility].  Patient Joaquin Dacosta was set up at [facility]  on April 14, 2023. Patient received a [employer] Airsense 10 Pressures were set at 5-15 cm H2O.   Patient s ramp is 5 cm H2O for Auto and FLEX/EPR is 2.  Patient received a [employer] Mask name: DreamWisp  Nasal mask size Standard, heated tubing and heated humidifier.  Patient has the following compliance requirements: using and visit requirements  Patient has a follow up on 6/13/23 with Dr. Ramirez.    Nori Banda

## 2023-04-17 ENCOUNTER — DOCUMENTATION ONLY (OUTPATIENT)
Dept: SLEEP MEDICINE | Facility: CLINIC | Age: 53
End: 2023-04-17
Payer: COMMERCIAL

## 2023-04-17 NOTE — PROGRESS NOTES
3 day Sleep therapy management telephone visit    Diagnostic AHI:  25.2 HST    Confirmed with patient at time of call- Yes Patient is still interested in STM service       Subjective measures:  Patient reports he used CPAP all night for the first time last night. The first two nights he only wore it a little while before he felt claustrophobic. He reports feeling some more energy this morning when he woke up.       Order settings:  CPAP MIN CPAP MAX   5 cm H2O 15 cm H2O       Device settings:  CPAP MIN CPAP MAX EPR RESMED SOFT RESPONSE SETTING   5 cm  H20 15 cm  H20 2 OFF       Compliance 33 %    Assessment: Nightly usage most nights under four hours       Action plan: Patient to have 14 day STM visit. Patient has a follow up visit scheduled:   yes within 61-90 days of set up    Replacement device: No  STM ordered by provider: Yes     Total time spent on accessing and  interpreting remote patient PAP therapy data  10 minutes    Total time spent counseling, coaching  and reviewing PAP therapy data with patient  4 minutes    [id number] no

## 2023-05-02 ENCOUNTER — DOCUMENTATION ONLY (OUTPATIENT)
Dept: SLEEP MEDICINE | Facility: CLINIC | Age: 53
End: 2023-05-02
Payer: COMMERCIAL

## 2023-05-02 DIAGNOSIS — G47.33 OSA (OBSTRUCTIVE SLEEP APNEA): Primary | ICD-10-CM

## 2023-05-02 NOTE — PROGRESS NOTES
14  DAY STM VISIT    Diagnostic AHI:  25.2 HST    Subjective measures: Patient reports that he is struggling with waking up after a few hours on CPAP and feeling like the pressure is too high and that he needs to remove the mask. Advised pt to try restarting the ramp when that happens before deciding to remove the mask. Will put in for a pressure change 5-7 cm H2O.     Assessment: Pt not meeting objective benchmarks for compliance  Patient failing following subjective benchmarks: pressure issues    Action plan: pt to have 30 day STM visit.      Device type: Auto-CPAP    PAP settings:  DEVICE TYPE CPAP MIN CPAP MAX 95TH % PRESSURE EPR MASK DISPENSED   Auto-CPAP    5.0 cm  H20 15.0 cm  H20 7.6 cm  H20  TWO Nasal Mask     Mask type:  Nasal Mask    Objective measures: 14 day rolling measures   COMPLIANCE LEAK AHI AVERAGE USE IN MINUTES   35 % 14.4 1.07 208   GOAL >70% GOAL < 24 LPM GOAL <5 GOAL >240          Total time spent on accessing and interpreting remote patient PAP therapy data  10 minutes    Total time spent counseling, coaching  and reviewing PAP therapy data with patient  3 minutes    [id number]  [id number]  no (3 day STM)

## 2023-05-09 DIAGNOSIS — I10 ESSENTIAL HYPERTENSION: ICD-10-CM

## 2023-05-10 RX ORDER — LISINOPRIL 20 MG/1
TABLET ORAL
Qty: 85 TABLET | Refills: 0 | OUTPATIENT
Start: 2023-05-10 | End: 2023-08-08

## 2023-05-10 NOTE — TELEPHONE ENCOUNTER
Refill denied. Should have refills on file. 90 tabs with 1 refill were ordered on 4/11/23.    Roas Maria Esparza RN  [facility]

## 2023-05-18 ENCOUNTER — DOCUMENTATION ONLY (OUTPATIENT)
Dept: SLEEP MEDICINE | Facility: CLINIC | Age: 53
End: 2023-05-18
Payer: COMMERCIAL

## 2023-05-18 ENCOUNTER — MYC MEDICAL ADVICE (OUTPATIENT)
Dept: FAMILY MEDICINE | Facility: CLINIC | Age: 53
End: 2023-05-18

## 2023-05-18 DIAGNOSIS — I10 ESSENTIAL HYPERTENSION: Primary | ICD-10-CM

## 2023-05-18 NOTE — PROGRESS NOTES
30 DAY STM VISIT    Diagnostic AHI:  25.2 HST    PAP settings:  CPAP MIN CPAP MAX 95TH % PRESSURE EPR RESMED SOFT RESPONSE SETTING   5.0 cm  H20 7.0 cm  H20 6.6 cm  H20  TWO OFF     Device type: Auto-CPAP  Mask type:  Nasal Mask    Objective measures: 14 day rolling measures:    COMPLIANCE LEAK AHI AVERAGE USE IN MINUTES   71 % 17.5 0.5 314   GOAL >70% GOAL < 24 LPM GOAL <5 GOAL >240        Assessment: Pt meeting objective benchmarks.     Message left for patient to return call   Action plan: waiting for patient to return call.  Patient has the following upcoming sleep appts:  Future Sleep Appointments       Provider Department    6/13/2023 9:30 AM (Arrive by 9:15 AM) Epifanio Ramirez MD [facility] [place]        Total time spent on accessing and interpreting remote patient PAP therapy data  10 minutes    Total time spent counseling, coaching  and reviewing PAP therapy data with patient  1 minutes     [id number] this call  [id number] no  at 3 or 14 day [facility]

## 2023-05-18 NOTE — TELEPHONE ENCOUNTER
Dr. Chang: I am not familiar with gagging as a side effect of lisinopril; do you recommend a change in rx?    Per my last visit, I mentioned that I was getting episodes of gagging ever since I started the lisinopril. They keep happening. I gooogled drugs that can cause gagging, and blood pressure drugs can cause this. I m wondering if we can get a different drug. It happened the other day while I was driving and I had to pull over till it subsided. My blood pressure is still slightly high. Averaging about 135/85.  I ll attempt to attach a screen shot of my blood pressure readings.     Hillary ORDAZ RN  [facility]

## 2023-05-19 RX ORDER — AMLODIPINE BESYLATE 10 MG/1
10 TABLET ORAL DAILY
Qty: 30 TABLET | Refills: 0 | Status: SHIPPED | OUTPATIENT
Start: 2023-05-19 | End: 2023-06-20

## 2023-05-19 NOTE — TELEPHONE ENCOUNTER
Writer called and left message on patient's voicemail to check [employer] for message.     Writer responded via [employer].    MORE MckeonN RN  [facility]

## 2023-05-19 NOTE — TELEPHONE ENCOUNTER
Lisinopril would be less likely culprit but ok to switch to amlodipine and see how he does.   Plan - stop lisinopril and start amlodipine.  Recheck with me in a 3-4 weeks. OK to use same day hold.

## 2023-06-12 DIAGNOSIS — I10 ESSENTIAL HYPERTENSION: ICD-10-CM

## 2023-06-13 ENCOUNTER — OFFICE VISIT (OUTPATIENT)
Dept: SLEEP MEDICINE | Facility: CLINIC | Age: 53
End: 2023-06-13
Payer: COMMERCIAL

## 2023-06-13 VITALS
HEART RATE: 69 BPM | DIASTOLIC BLOOD PRESSURE: 80 MMHG | OXYGEN SATURATION: 99 % | WEIGHT: 171.4 LBS | HEIGHT: 69 IN | BODY MASS INDEX: 25.39 KG/M2 | SYSTOLIC BLOOD PRESSURE: 122 MMHG

## 2023-06-13 DIAGNOSIS — G47.33 OSA (OBSTRUCTIVE SLEEP APNEA): Primary | ICD-10-CM

## 2023-06-13 PROCEDURE — 99214 OFFICE O/P EST MOD 30 MIN: CPT | Performed by: INTERNAL MEDICINE

## 2023-06-13 ASSESSMENT — SLEEP AND FATIGUE QUESTIONNAIRES
HOW LIKELY ARE YOU TO NOD OFF OR FALL ASLEEP WHILE LYING DOWN TO REST IN THE AFTERNOON WHEN CIRCUMSTANCES PERMIT: MODERATE CHANCE OF DOZING
HOW LIKELY ARE YOU TO NOD OFF OR FALL ASLEEP WHILE WATCHING TV: MODERATE CHANCE OF DOZING
HOW LIKELY ARE YOU TO NOD OFF OR FALL ASLEEP WHILE SITTING INACTIVE IN A PUBLIC PLACE: WOULD NEVER DOZE
HOW LIKELY ARE YOU TO NOD OFF OR FALL ASLEEP IN A CAR, WHILE STOPPED FOR A FEW MINUTES IN TRAFFIC: WOULD NEVER DOZE
HOW LIKELY ARE YOU TO NOD OFF OR FALL ASLEEP WHILE SITTING AND READING: MODERATE CHANCE OF DOZING
HOW LIKELY ARE YOU TO NOD OFF OR FALL ASLEEP WHEN YOU ARE A PASSENGER IN A CAR FOR AN HOUR WITHOUT A BREAK: SLIGHT CHANCE OF DOZING
HOW LIKELY ARE YOU TO NOD OFF OR FALL ASLEEP WHILE SITTING QUIETLY AFTER LUNCH WITHOUT ALCOHOL: SLIGHT CHANCE OF DOZING
HOW LIKELY ARE YOU TO NOD OFF OR FALL ASLEEP WHILE SITTING AND TALKING TO SOMEONE: WOULD NEVER DOZE

## 2023-06-13 NOTE — PATIENT INSTRUCTIONS
"          MY TREATMENT INFORMATION FOR SLEEP APNEA-  Joaquin Dacosta    DOCTOR : Epifanio Ramirez MD, MD  Frequently asked questions:  1. What is Obstructive Sleep Apnea (REYES)? REYES is the most common type of sleep apnea. Apnea means, \"without breath.\"  Apnea is most often caused by narrowing or collapse of the upper airway as muscles relax during sleep.   Almost everyone has occasional apneas. Most people with sleep apnea have had brief interruptions at night frequently for many years.  The severity of sleep apnea is related to how frequent and severe the events are.   2. What are the consequences of REYES? Symptoms include: feeling sleepy during the day, snoring loudly, gasping or stopping of breathing, trouble sleeping, and occasionally morning headaches or heartburn at night.  Sleepiness can be serious and even increase the risk of falling asleep while driving. Other health consequences may include development of high blood pressure and other cardiovascular disease in persons who are susceptible. Untreated REYES  can contribute to heart disease, stroke and diabetes.   3. What are the treatment options? In most situations, sleep apnea is a lifelong disease that must be managed with daily therapy. Medications are not effective for sleep apnea and surgery is generally not considered until other therapies have been tried. Your treatment is your choice . Continuous Positive Airway (CPAP) works right away and is the therapy that is effective in nearly everyone. An oral device to hold your jaw forward is usually the next most reliable option. Other options include postioning devices (to keep you off your back), weight loss, and surgery including a tongue pacing device. There is more detail about some of these options below.  4. Are my sleep studies covered by insurance? Although we will request verification of coverage, we advise you also check in advance of the study to ensure there is coverage.    Important tips for those " choosing CPAP and similar devices  For new devices, sign up for device VINAY to monitor your device for your followup visits  We encourage you to utilize the [employer] vinay or website ([employer] web (resmed.com) ) to monitor your therapy progress and share the data with your healthcare team when you discuss your sleep apnea.                                                    Know your equipment:  CPAP is continuous positive airway pressure that prevents obstructive sleep apnea by keeping the throat from collapsing while you are sleeping. In most cases, the device is  smart  and can slowly self-adjusts if your throat collapses and keeps a record every day of how well you are treated-this information is available to you and your care team.  BPAP is bilevel positive airway pressure that keeps your throat open and also assists each breath with a pressure boost to maintain adequate breathing.  Special kinds of BPAP are used in patients who have inadequate breathing from lung or heart disease. In most cases, the device is  smart  and can slowly self-adjusts to assist breathing. Like CPAP, the device keeps a record of how well you are treated.  Your mask is your connection to the device. You get to choose what feels most comfortable and the staff will help to make sure if fits. Here: are some examples of the different masks that are available:       Key points to remember on your journey with sleep apnea:  Sleep study.  PAP devices often need to be adjusted during a sleep study to show that they are effective and adjusted right.  Good tips to remember: Try wearing just the mask during a quiet time during the day so your body adapts to wearing it. A humidifier is recommended for comfort in most cases to prevent drying of your nose and throat. Allergy medication from your provider may help you if you are having nasal congestion.  Getting settled-in. It takes more than one night for most of us to get used to wearing a mask.  Try wearing just the mask during a quiet time during the day so your body adapts to wearing it. A humidifier is recommended for comfort in most cases. Our team will work with you carefully on the first day and will be in contact within 4 days and again at 2 and 4 weeks for advice and remote device adjustments. Your therapy is evaluated by the device each day.   Use it every night. The more you are able to sleep naturally for 7-8 hours, the more likely you will have good sleep and to prevent health risks or symptoms from sleep apnea. Even if you use it 4 hours it helps. Occasionally all of us are unable to use a medical therapy, in sleep apnea, it is not dangerous to miss one night.   Communicate. Call our skilled team on the number provided on the first day if your visit for problems that make it difficult to wear the device. Over 2 out of 3 patients can learn to wear the device long-term with help from our team. Remember to call our team or your sleep providers if you are unable to wear the device as we may have other solutions for those who cannot adapt to mask CPAP therapy. It is recommended that you sleep your sleep provider within the first 3 months and yearly after that if you are not having problems.   Use it for your health. We encourage use of CPAP masks during daytime quiet periods to allow your face and brain to adapt to the sensation of CPAP so that it will be a more natural sensation to awaken to at night or during naps. This can be very useful during the first few weeks or months of adapting to CPAP though it does not help medically to wear CPAP during wakefulness and  should not be used as a strategy just to meet guidelines.  Take care of your equipment. Make sure you clean your mask and tubing using directions every day and that your filter and mask are replaced as recommended or if they are not working.     BESIDES CPAP, WHAT OTHER THERAPIES ARE THERE?    Positioning Device  Positioning devices are  generally used when sleep apnea is mild and only occurs on your back.This example shows a pillow that straps around the waist. It may be appropriate for those whose sleep study shows milder sleep apnea that occurs primarily when lying flat on one's back. Preliminary studies have shown benefit but effectiveness at home may need to be verified by a home sleep test. These devices are generally not covered by medical insurance.  Examples of devices that maintain sleeping on the back to prevent snoring and mild sleep apnea.    Belt type body positioner  http://[facility]/    Electronic reminder  http://nightshifttherapy.com/            Oral Appliance  What is oral appliance therapy?  An oral appliance device fits on your teeth at night like a retainer used after having braces. The device is made by a specialized dentist and requires several visits over 1-2 months before a manufactured device is made to fit your teeth and is adjusted to prevent your sleep apnea. Once an oral device is working properly, snoring should be improved. A home sleep test may be recommended at that time if to determine whether the sleep apnea is adequately treated.       Some things to remember:  -Oral devices are often, but not always, covered by your medical insurance. Be sure to check with your insurance provider.   -If you are referred for oral therapy, you will be given a list of specialized dentists to consider or you may choose to visit the Web site of the American Academy of Dental Sleep Medicine  -Oral devices are less likely to work if you have severe sleep apnea or are extremely overweight.     More detailed information  An oral appliance is a small acrylic device that fits over the upper and lower teeth  (similar to a retainer or a mouth guard). This device slightly moves jaw forward, which moves the base of the tongue forward, opens the airway, improves breathing for effective treat snoring and obstructive sleep apnea in perhaps 7  out of 10 people .  The best working devices are custom-made by a dental device  after a mold is made of the teeth 1, 2, 3.  When is an oral appliance indicated?  Oral appliance therapy is recommended as a first-line treatment for patients with primary snoring, mild sleep apnea, and for patients with moderate sleep apnea who prefer appliance therapy to use of CPAP4, 5. Severity of sleep apnea is determined by sleep testing and is based on the number of respiratory events per hour of sleep.   How successful is oral appliance therapy?  The success rate of oral appliance therapy in patients with mild sleep apnea is 75-80% while in patients with moderate sleep apnea it is 50-70%. The chance of success in patients with severe sleep apnea is 40-50%. The research also shows that oral appliances have a beneficial effect on the cardiovascular health of REYES patients at the same magnitude as CPAP therapy7.  Oral appliances should be a second-line treatment in cases of severe sleep apnea, but if not completely successful then a combination therapy utilizing CPAP plus oral appliance therapy may be effective. Oral appliances tend to be effective in a broad range of patients although studies show that the patients who have the highest success are females, younger patients, those with milder disease, and less severe obesity. 3, 6.   Finding a dentist that practices dental sleep medicine  Specific training is available through the American Academy of Dental Sleep Medicine for dentists interested in working in the field of sleep. To find a dentist who is educated in the field of sleep and the use of oral appliances, near you, visit the Web site of the American Academy of Dental Sleep Medicine.    References  1. Elier et al. Objectively measured vs self-reported compliance during oral appliance therapy for sleep-disordered breathing. Chest 2013; 144(5): 0269-0086.  2. Stephanie et al. Objective measurement of  compliance during oral appliance therapy for sleep-disordered breathing. Thorax 2013; 68(1): 91-96.  3. Dawood, et al. Mandibular advancement devices in 620 men and women with REYES and snoring: tolerability and predictors of treatment success. Chest 2004; 125: 1245-7619.  4. Milady et al. Oral appliances for snoring and REYES: a review. Sleep 2006; 29: 244-262.  5. Natividad et al. Oral appliance treatment for REYES: an update. J Clin Sleep Med 2014; 10(2): 215-227.  6. Vikash et al. Predictors of OSAH treatment outcome. J Dent Res 2007; 86: 5980-5718.    Surgery:    Surgery for obstructive sleep apnea is considered generally only when other therapies fail to work. Surgery may be discussed with you if you are having a difficult time tolerating CPAP and or when there is an abnormal structure that requires surgical correction.  Nose and throat surgeries often enlarge the airway to prevent collapse.  Most of these surgeries create pain for 1-2 weeks and up to half of the most common surgeries are not effective throughout life.  You should carefully discuss the benefits and drawbacks to surgery with your sleep provider and surgeon to determine if it is the best solution for you.   More information  Surgery for REYES is directed at areas that are responsible for narrowing or complete obstruction of the airway during sleep.  There are a wide range of procedures available to enlarge and/or stabilize the airway to prevent blockage of breathing in the three major areas where it can occur: the palate, tongue, and nasal regions.  Successful surgical treatment depends on the accurate identification of the factors responsible for obstructive sleep apnea in each person.  A personalized approach is required because there is no single treatment that works well for everyone.  Because of anatomic variation, consultation with an examination by a sleep surgeon is a critical first step in determining what surgical options are best for  each patient.  In some cases, examination during sedation may be recommended in order to guide the selection of procedures.  Patients will be counseled about risks and benefits as well as the typical recovery course after surgery. Surgery is typically not a cure for a person s REYES.  However, surgery will often significantly improve one s REYES severity (termed  success rate ).  Even in the absence of a cure, surgery will decrease the cardiovascular risk associated with OSA7; improve overall quality of life8 (sleepiness, functionality, sleep quality, etc).      Palate Procedures:  Patients with REYES often have narrowing of their airway in the region of their tonsils and uvula.  The goals of palate procedures are to widen the airway in this region as well as to help the tissues resist collapse.  Modern palate procedure techniques focus on tissue conservation and soft tissue rearrangement, rather than tissue removal.  Often the uvula is preserved in this procedure. Residual sleep apnea is common in patient after pharyngoplasty with an average reduction in sleep apnea events of 33%2.      Tongue Procedures:  ExamWhile patients are awake, the muscles that surround the throat are active and keep this region open for breathing. These muscles relax during sleep, allowing the tongue and other structures to collapse and block breathing.  There are several different tongue procedures available.  Selection of a tongue base procedure depends on characteristics seen on physical exam.  Generally, procedures are aimed at removing bulky tissues in this area or preventing the back of the tongue from falling back during sleep.  Success rates for tongue surgery range from 50-62%3.    Hypoglossal Nerve Stimulation:  Hypoglossal nerve stimulation has recently received approval from the [place] Food and Drug Administration for the treatment of obstructive sleep apnea.  This is based on research showing that the system was safe and  effective in treating sleep apnea6.  Results showed that the median AHI score decreased 68%, from 29.3 to 9.0. This therapy uses an implant system that senses breathing patterns and delivers mild stimulation to airway muscles, which keeps the airway open during sleep.  The system consists of three fully implanted components: a small generator (similar in size to a pacemaker), a breathing sensor, and a stimulation lead.  Using a small handheld remote, a patient turns the therapy on before bed and off upon awakening.    Candidates for this device must be greater than 18 years of age, have moderate to severe REYES (AHI between 15-65), BMI less than 35, have tried CPAP/oral appliance for at least 8 weeks without success, and have appropriate upper airway anatomy (determined by a sleep endoscopy performed by Dr. Dylan London).    Hypoglossal Nerve Stimulation Pathway:    The sleep surgeon s office will work with the patient through the insurance prior-authorization process (including communications and appeals).    Nasal Procedures:  Nasal obstruction can interfere with nasal breathing during the day and night.  Studies have shown that relief of nasal obstruction can improve the ability of some patients to tolerate positive airway pressure therapy for obstructive sleep apnea1.  Treatment options include medications such as nasal saline, topical corticosteroid and antihistamine sprays, and oral medications such as antihistamines or decongestants. Non-surgical treatments can include external nasal dilators for selected patients. If these are not successful by themselves, surgery can improve the nasal airway either alone or in combination with these other options.      Combination Procedures:  Combination of surgical procedures and other treatments may be recommended, particularly if patients have more than one area of narrowing or persistent positional disease.  The success rate of combination surgery ranges from 66-80%2,3.     References  Kathryn NICOLE. The Role of the Nose in Snoring and Obstructive Sleep Apnoea: An Update.  Eur Arch Otorhinolaryngol. 2011; 268: 1365-73.   Venancio SM; Tacos JA; Reginald JR; Pallanch JF; Tessa MB; Felisha SG; Arpit ERICKSON. Surgical modifications of the upper airway for obstructive sleep apnea in adults: a systematic review and meta-analysis. SLEEP 2010;33(10):1133-1157. Yusef JOSHI. Hypopharyngeal surgery in obstructive sleep apnea: an evidence-based medicine review.  Arch Otolaryngol Head Neck Surg. 2006 Feb;132(2):206-13.  Ned YH1, Tommy Y, Dequan RASHAWN. The efficacy of anatomically based multilevel surgery for obstructive sleep apnea. Otolaryngol Head Neck Surg. 2003 Oct;129(4):327-35.  Yusef JOSHI, Goldberg A. Hypopharyngeal Surgery in Obstructive Sleep Apnea: An Evidence-Based Medicine Review. Arch Otolaryngol Head Neck Surg. 2006 Feb;132(2):206-13.  Jeni PJ et al. Upper-Airway Stimulation for Obstructive Sleep Apnea.  N Engl J Med. 2014 Jan 9;370(2):139-49.  Vick Y et al. Increased Incidence of Cardiovascular Disease in Middle-aged Men with Obstructive Sleep Apnea. Am J Respir Crit Care Med; 2002 166: 159-165  Jasmyne EM et al. Studying Life Effects and Effectiveness of Palatopharyngoplasty (SLEEP) study: Subjective Outcomes of Isolated Uvulopalatopharyngoplasty. Otolaryngol Head Neck Surg. 2011; 144: 623-631.

## 2023-06-13 NOTE — PROGRESS NOTES
Obstructive Sleep Apnea - PAP Follow-Up Visit:    Chief Complaint   Patient presents with     CPAP Follow Up     CPAP follow up last 3-4 hours and can't fall back asleep. Dry throat       Joaquin Dacosta comes in today for follow-up of their moderate sleep apnea, managed with CPAP.     HST on 175 lbs showed an AHI of 25.2 per hour. AHI was 34.7 per hour supine, N/A per hour prone, 33.6 per hour on left side, and 12.4 per hour on right side. Baseline oxygen saturation was 93%.  Time with saturation less than or equal to 88% was 9 minutes. The lowest oxygen saturation was 82%.     Patient reports that he is not liking CPAP therapy.  He cannot articulate specific problems with CPAP.  Although he falls asleep with CPAP without any significant difficulty, after he wakes up in the night, he is unable to return to sleep with CPAP on.  The mask is not leaking.   He is not having gasp arousals.  He is not having significant oral/nasal dryness. The pressure settings are comfortable.     He uses nasal mask.     Bedtime is typically 10 PM. Usually it takes about 10 minutes to fall asleep with the mask on. Wake time is typically 6:30 AM.  Patient is using PAP therapy 4-5 hours per night. The patient is usually getting 7-8 hours of sleep per night.    EPWORTH SLEEPINESS SCALE         3/13/2023     9:00 AM    [place] Sleepiness Scale ( CONSTANTIN Abdullahi  [id number]<br>ESS - [place]/English - Final version - 21 Nov 07 - [facility] [place].)   [place] Score (Sleep) 8       INSOMNIA SEVERITY INDEX (LONNY)          6/13/2023     9:08 AM   Insomnia Severity Index (LONNY)   Difficulty falling asleep 1   Difficulty staying asleep 3   Problems waking up too early 3   How SATISFIED/DISSATISFIED are you with your CURRENT sleep pattern? 3   How NOTICEABLE to others do you think your sleep problem is in terms of impairing the quality of your life? 2   How WORRIED/DISTRESSED are you about your current sleep problem? 3   To what extent do you consider  "your sleep problem to INTERFERE with your daily functioning (e.g. daytime fatigue, mood, ability to function at work/daily chores, concentration, memory, mood, etc.) CURRENTLY? 3   LONNY Total Score 18       Guidelines for Scoring/Interpretation:  Total score categories:  0-7 = No clinically significant insomnia   8-14 = Subthreshold insomnia   15-21 = Clinical insomnia (moderate severity)  22-28 = Clinical insomnia (severe)  Used via courtesy of www."[employer].va.gov with permission from Sergey Moore PhD., [facility]    [employer]Med     Auto-PAP 5.0 - 7.0 cmH2O 30 day usage data:    40% of days with > 4 hours of use. 0/30 days with no use.   Average use 247 minutes per day.   95%ile Leak 11.86 L/min.   CPAP 95% pressure 6.8 cm.   AHI 0.15 events per hour.       /80   Pulse 69   Ht 1.753 m (5' 9.02\")   Wt 77.7 kg (171 lb 6.4 oz)   SpO2 99%   BMI 25.30 kg/m      Impression/Plan:     Moderate sleep apnea.     -Patient is not tolerating CPAP very well.  Although, he does not have any significant difficulty falling asleep with CPAP, after he wakes up in the night, he is unable to return to sleep with therapy.  Average usage is around 4 hours.  Residual AHI is normal at current AutoPap settings.  For his comfort, he is set to a rather low and narrow range of 5 to 7 cm H2O.    -Alternate therapy options for moderate obstructive sleep apnea were reviewed in detail.  We discussed dental appliance therapy and I provided a list of dentists that he can consult if he remains intolerant of CPAP.  Patient had questions regarding hypoglossal nerve stimulator therapy which were answered.  If he remains intolerant of CPAP and dental appliance therapy is not an option, we can consider [facility].    -Patient plans to continue trying to use CPAP regularly.    Plan:     1.  Continue auto CPAP therapy with a pressure range of 5 to 7 cm H2O  2.  Dental sleep medicine referral if unable to use CPAP        Epifanio Ramirez MD    CC:  " Lefty Chang,

## 2023-06-13 NOTE — NURSING NOTE
"Chief Complaint   Patient presents with     CPAP Follow Up     CPAP follow up last 3-4 hours and can't fall back asleep. Dry throat       Initial /80   Pulse 69   Ht 1.753 m (5' 9.02\")   Wt 77.7 kg (171 lb 6.4 oz)   SpO2 99%   BMI 25.30 kg/m   Estimated body mass index is 25.3 kg/m  as calculated from the following:    Height as of this encounter: 1.753 m (5' 9.02\").    Weight as of this encounter: 77.7 kg (171 lb 6.4 oz).    Medication Reconciliation: complete  ESS 8  Valerio Ortega MA    "

## 2023-06-15 NOTE — TELEPHONE ENCOUNTER
Routing refill request to provider for review/approval because:  --Prefer provider authorize this time for clarity, as only order in chart is per RN.  --See 5/18/23 MyChart encounter for discussion.      --Last visit:  4/11/2023 Bernardo.    --Future Visit:   Next 5 appointments (look out 90 days)    Jun 20, 2023  3:30 PM  (Arrive by 3:10 PM)  Provider Visit with Lefty Chang MD  [facility] ([facility] ) [facility] [place] [address]  [phone]          --Last Written Prescription:     Disp Refills Start End JOSE DANIEL   amLODIPine (NORVASC) 10 MG tablet 30 tablet 0 5/19/2023  --   Sig - Route: Take 1 tablet (10 mg) by mouth daily - Oral         BP Readings from Last 6 Encounters:   06/13/23 122/80   04/11/23 (!) 120/90   03/13/23 132/80   02/15/23 (!) 151/101   01/12/23 (!) 140/90   12/07/22 (!) 142/90

## 2023-06-20 ENCOUNTER — OFFICE VISIT (OUTPATIENT)
Dept: FAMILY MEDICINE | Facility: CLINIC | Age: 53
End: 2023-06-20
Payer: COMMERCIAL

## 2023-06-20 VITALS
HEIGHT: 68 IN | WEIGHT: 169 LBS | TEMPERATURE: 98.5 F | DIASTOLIC BLOOD PRESSURE: 86 MMHG | HEART RATE: 71 BPM | BODY MASS INDEX: 25.61 KG/M2 | OXYGEN SATURATION: 98 % | SYSTOLIC BLOOD PRESSURE: 130 MMHG

## 2023-06-20 DIAGNOSIS — E78.5 HYPERLIPIDEMIA LDL GOAL <130: ICD-10-CM

## 2023-06-20 DIAGNOSIS — B00.1 RECURRENT HERPES LABIALIS: ICD-10-CM

## 2023-06-20 DIAGNOSIS — I10 ESSENTIAL HYPERTENSION: ICD-10-CM

## 2023-06-20 PROCEDURE — 99214 OFFICE O/P EST MOD 30 MIN: CPT | Performed by: FAMILY MEDICINE

## 2023-06-20 PROCEDURE — 80061 LIPID PANEL: CPT | Performed by: FAMILY MEDICINE

## 2023-06-20 PROCEDURE — 36415 COLL VENOUS BLD VENIPUNCTURE: CPT | Performed by: FAMILY MEDICINE

## 2023-06-20 RX ORDER — ATORVASTATIN CALCIUM 40 MG/1
40 TABLET, FILM COATED ORAL DAILY
Qty: 90 TABLET | Refills: 3 | Status: SHIPPED | OUTPATIENT
Start: 2023-06-20 | End: 2024-06-12

## 2023-06-20 RX ORDER — AMLODIPINE BESYLATE 10 MG/1
10 TABLET ORAL DAILY
Qty: 90 TABLET | Refills: 2 | OUTPATIENT
Start: 2023-06-20

## 2023-06-20 RX ORDER — VALACYCLOVIR HYDROCHLORIDE 1 G/1
TABLET, FILM COATED ORAL
Qty: 4 TABLET | Refills: 11 | Status: SHIPPED | OUTPATIENT
Start: 2023-06-20 | End: 2024-07-01

## 2023-06-20 RX ORDER — AMLODIPINE BESYLATE 10 MG/1
10 TABLET ORAL DAILY
Qty: 90 TABLET | Refills: 3 | Status: SHIPPED | OUTPATIENT
Start: 2023-06-20 | End: 2024-06-12

## 2023-06-20 NOTE — PROGRESS NOTES
Assessment & Plan     Essential hypertension  Home blood pressure reading still slightly on higher side for diastolic but most of the readings are within acceptable range.  We agreed not to adjust Rx and stick to same dose of amlodipine.  He is tolerating it well.  Discussed side effects.  - amLODIPine (NORVASC) 10 MG tablet; Take 1 tablet (10 mg) by mouth daily    Hyperlipidemia LDL goal <130  Patient is tolerating current medication without any major side effects of concerns and current dose seems reasonable too.  Current medication regime is effective. Continue current treatment without any changes.   - atorvastatin (LIPITOR) 40 MG tablet; Take 1 tablet (40 mg) by mouth daily  - Lipid panel reflex to direct LDL Non-fasting; Future  - Lipid panel reflex to direct LDL Non-fasting    Recurrent herpes labialis  Patient is tolerating current medication without any major side effects of concerns and current dose seems reasonable too.  Current medication regime is effective. Continue current treatment without any changes.   - valACYclovir (VALTREX) 1000 mg tablet; Take 2 tablets twice per day at the onset of cold sore.    Can follow-up in a year and can have a physical at that time.    Lefty Chang MD, MD  [facility]    Gennaro Nuñez is a 52 year old, presenting for the following health issues:  Follow Up (Hypertension)        6/20/2023     3:07 PM   Additional Questions   Roomed by Roxi ABEL     History of Present Illness       Hypertension: He presents for follow up of hypertension.  He does check blood pressure  regularly outside of the clinic. Outside blood pressures have been over 140/90. He does not follow a low salt diet.     He eats 2-3 servings of fruits and vegetables daily.He consumes 2 sweetened beverage(s) daily.He exercises with enough effort to increase his heart rate 9 or less minutes per day.  He exercises with enough effort to increase his heart rate 3 or  "less days per week.   He is taking medications regularly.     Home BP Readings   04/29/2023 115/81 P: 74  05/03/2023 138/85 P: 67  05/04/2023 129/84 P: 73  05/08/2023 138/87 P: 70  05/09/2023 134/84 P: 68  05/10/2023 136/89 P: 65  05/11/2023 126/87 P: 65  05/12/2023 125/83 P: 71  05/13/2023 128/87 P: 70  05/15/2023 135/95 P: 75  05/16/2023 143/92 P:71    Also has last months readings but could not pull up on phone.     No leg swelling.     Takes valtrex for cold sore. Long time between but for last 1 month twice. Wondering if it is from cpap machine.     Saw sleep specialist.      Hardly needing to use inhaler. Mostly cat exposure and really maryan weather.    Review of Systems         Objective    /86   Pulse 71   Temp 98.5  F (36.9  C) (Oral)   Ht 1.727 m (5' 8\")   Wt 76.7 kg (169 lb)   SpO2 98%   BMI 25.70 kg/m    Body mass index is 25.7 kg/m .  Physical Exam   No leg swelling                    "

## 2023-06-20 NOTE — PATIENT INSTRUCTIONS
=======================================  FYI:     System will autorelease results as soon as they are available. I usually wait for all results to be ready before sending you a comment or message.  Be assured I will review and comment on all of your results as soon as I can.     I am at [facility]  ([phone]) usually Monday, Tuesday and Wednesday.   Messages, evisits, phone calls received on Thursday and Friday may not get responded very urgently but I will try to respond as soon as possible.     2) My schedule sometime been booking out far in advance. I apologize for the lack of timely access. If you need to be seen for a chronic condition or preventive (wellness) visit, please be sure to schedule that appointment few months in advance.  If you have a concern that you feel cannot wait until my next available appointment (such as a hospital follow-up or new symptom of concern) please ask to speak to one of the [facility] nurses who may be able to access a sooner appointment.      You can schedule a video visit for follow-up appointments as well as future appointments for certain conditions.  Please see the below link.     Video Visits ([email or web address])     If you have not already done so,  I encourage you to sign up for [facility] ([email or web address]).  This will allow you to review your results, securely communicate with a provider, and schedule virtual visits as well.

## 2023-06-21 LAB
CHOLEST SERPL-MCNC: 206 MG/DL
HDLC SERPL-MCNC: 83 MG/DL
LDLC SERPL CALC-MCNC: 90 MG/DL
NONHDLC SERPL-MCNC: 123 MG/DL
TRIGL SERPL-MCNC: 167 MG/DL

## 2023-06-28 ENCOUNTER — PATIENT OUTREACH (OUTPATIENT)
Dept: CARE COORDINATION | Facility: CLINIC | Age: 53
End: 2023-06-28
Payer: COMMERCIAL

## 2023-07-12 ENCOUNTER — PATIENT OUTREACH (OUTPATIENT)
Dept: CARE COORDINATION | Facility: CLINIC | Age: 53
End: 2023-07-12
Payer: COMMERCIAL

## 2023-09-03 ENCOUNTER — HEALTH MAINTENANCE LETTER (OUTPATIENT)
Age: 53
End: 2023-09-03

## 2024-02-02 ENCOUNTER — TELEPHONE (OUTPATIENT)
Dept: FAMILY MEDICINE | Facility: CLINIC | Age: 54
End: 2024-02-02

## 2024-02-02 ENCOUNTER — VIRTUAL VISIT (OUTPATIENT)
Dept: URGENT CARE | Facility: CLINIC | Age: 54
End: 2024-02-02
Payer: COMMERCIAL

## 2024-02-02 ENCOUNTER — E-VISIT (OUTPATIENT)
Dept: FAMILY MEDICINE | Facility: CLINIC | Age: 54
End: 2024-02-02
Payer: COMMERCIAL

## 2024-02-02 DIAGNOSIS — U07.1 INFECTION DUE TO 2019 NOVEL CORONAVIRUS: Primary | ICD-10-CM

## 2024-02-02 PROCEDURE — 99441 PR PHYSICIAN TELEPHONE EVALUATION 5-10 MIN: CPT | Mod: 93

## 2024-02-02 PROCEDURE — 99207 PR NON-BILLABLE SERV PER CHARTING: CPT | Performed by: FAMILY MEDICINE

## 2024-02-02 NOTE — PROGRESS NOTES
"Joaquin is a 53 year old who is being evaluated via a billable telephone visit.      How would you like to obtain your AVS? MyChart  If the video visit is dropped, the invitation should be resent by:   Will anyone else be joining your video visit? No          Assessment & Plan     Infection due to 2019 novel coronavirus    - nirmatrelvir and ritonavir (PAXLOVID) 300 mg/100 mg therapy pack; Take 3 tablets by mouth 2 times daily for 5 days (Take 2 Nirmatrelvir tablets and 1 Ritonavir tablet twice daily for 5 days)      BMI  Estimated body mass index is 25.7 kg/m  as calculated from the following:    Height as of 6/20/23: 1.727 m (5' 8\").    Weight as of 6/20/23: 76.7 kg (169 lb).         COVID-19 positive patient.  Encounter for consideration of medication intervention. Patient does qualify for a prescription. Full discussion with patient including medication options, risks and benefits. Potential drug interactions reviewed with patient.     Treatment Planned  Paxlovid sent to [facility] in [place]    Temporary change to home medications: Holding atorvastatin for 8 days and decreasing amlodipine to half dose for 8 days     Estimated body mass index is 25.7 kg/m  as calculated from the following:    Height as of 6/20/23: 1.727 m (5' 8\").    Weight as of 6/20/23: 76.7 kg (169 lb).  GFR Estimate   Date Value Ref Range Status   04/11/2023 82 >60 mL/min/1.73m2 Final     Comment:     eGFR calculated using 2021 CKD-EPI equation.   02/08/2021 84 >60 mL/min/[1.73_m2] Final     Comment:     Non  GFR Calc  Starting 12/18/2018, serum creatinine based estimated GFR (eGFR) will be   calculated using the Chronic Kidney Disease Epidemiology Collaboration   (CKD-EPI) equation.       Lab Results   Component Value Date    ZKNMA29KNS Not Detected 10/26/2020       Return in about 1 week (around 2/9/2024), or if symptoms worsen or fail to improve.    Subjective   Joaquin is a 53 year old, presenting for the following " health issues:  No chief complaint on file.    HPI       COVID-19 Symptom Review  How many days ago did these symptoms start? 1 day ago    Are any of the following symptoms significant for you?  New or worsening difficulty breathing? No  Worsening cough? Yes, it's a dry cough.   Fever or chills? yes  Headache: No  Sore throat: No  Chest pain: No  Diarrhea: No  Body aches? YES    What treatments has patient tried? Nonsteroidals   Does patient live in a nursing home, group home, or shelter? No  Does patient have a way to get food/medications during quarantined? Yes, I have a friend or family member who can help me.                Review of Systems  Constitutional, HEENT, cardiovascular, pulmonary, gi and gu systems are negative, except as otherwise noted.      Objective           Vitals:  No vitals were obtained today due to virtual visit.    Physical Exam   GENERAL: alert and no distress  RESP: No audible wheeze, cough.      Telephone visit lasted  5 minutes    Signed Electronically by: [facility]

## 2024-02-02 NOTE — PATIENT INSTRUCTIONS
Hold atorvastatin for 8 days  Take half dose of amlodipine while on Paxlovid and monitor your blood pressure for 8 days.       For informational purposes only. Not to replace the advice of your health care provider. Copyright   2022 [facility]. All rights reserved. Clinically reviewed by Hillary Johnston, PharmD, BCACP. [employer] [id number] - REV 06/23.  COVID-19 Outpatient Treatments  Your care team can help you find the best treatments for COVID-19. Talk to a health care provider or refer to the FDA medicine fact sheets below.  Paxlovid (nirmatrelvir and ritonavir): https://www.paxlovid.[employer]/resources  Molnupiravir (Lagevrio): https://www.fda.gov/media/010627/download  Important: We can only prescribe Paxlovid or Molnupiravir when it can be started within 5 days of first having symptoms.  Paxlovid (nimatrelvir and ritonavir)  How it works  Two medicines (nirmatrelvir and ritonavir) are taken together. They stop the virus from growing. Less amount of virus is easier for your body to fight.  Benefits  Lowers risk of a hospital stay or death from COVID-19.  How to take  Medicine comes in a daily container with both medicine tablets. Take by mouth twice daily (once in the morning, once at night) for 5 days.  The number of tablets to take varies by patient.  Don't chew or break capsules. Swallow whole.  When to take  Take it as soon as possible and within 5 days of your first symptoms.  Who can take it  Patients must be 12 years or older weigh at least 88 pounds. Paxlovid is the preferred treatment for pregnant patients.  Possible side effects  Can cause altered sense of taste, diarrhea (loose, watery stools), high blood pressure, muscle aches.  Medicine conflicts  Some medicines may conflict with Paxlovid and may cause serious side effects.  Tell your care team about all the medicines you take, including prescription and over-the-counter medicines, vitamins, and herbal supplements.  Your care team will review  your medicines to make sure that you can safely take Paxlovid.  Cautions  Paxlovid is not advised for patients with severe kidney or liver disease. If you have kidney or liver problems, the dose may need to be changed.  If you're pregnant or breastfeeding, talk to your care team about your options.  If you take hormonal birth control (such as the Pill), then you or your partner should also use a non-hormonal form of birth control (such as a condom). Keep doing this for 1 menstrual cycle after your last dose of Paxlovid.  Molnupiravir (lagevrio)  How it works  Stops the virus from growing. Less amount of virus is easier for your body to fight.  Benefits  Lowers risk of a hospital stay or death from COVID-19.  How to take  Take 4 capsules by mouth every 12 hours (4 in the morning and 4 at night) for 5 days. Don't chew or break capsules. Swallow whole.  When to take  Take as soon as possible and within 5 days of your first symptoms.  Who can take it  Patients must be 18 years or older.   Possible side effects  Diarrhea (loose, watery stools), nausea (feeling sick to your stomach), dizziness, headaches.  Medicine conflicts  Right now, there are no known conflicts with other drugs. But tell your care team about all medicines you take.  Cautions  This medicine is not advised for patients who are pregnant.  If you are someone who could become pregnant, use trusted birth control until 4 days after your last dose of molnupiravir.  If your partner could become pregnant, you should use trusted birth control until 3 months after your last dose of molnupiravir.      Coping with Life After COVID-19  Being in the hospital because of COVID-19 is scary. Going home can be scary, too. You may face changes to your life, the way you work or what you can eat. It s hard to adjust to change, and it s normal to feel afraid, frustrated or even angry. These feelings usually go away over time. If your feelings don t start to get better, it s  called  adjustment disorder.      What signs should I look out for?  Adjustment disorder can happen to anyone in a time of stress. It makes it hard to cope with daily life. You may feel lonely or fight with loved ones, even if you re glad to be home. Watch for these signs:  Fear or worry  Hard time focusing  Sadness or anger  Trouble talking to family or friends  Feeling like you don t fit in or isolating yourself  Problems with sleep   Drinking alcohol or taking drugs to cope    What can I do?  You can help yourself get better. Feeling you have control helps you move forward. You may wonder if you ll be able to do things you did before. Be patient. Do your best to make the most of every day. Try to build relationships, be as active as you can, eat right and keep a sense of humor. Avoid smoking and drinking too much alcohol. Call your family doctor or clinic if you re not sure what to do. They can guide you to care or other services.    When should I get help?  Think about getting counseling if your sadness or frustration gets worse. Together with a trained counselor, you can talk about your problems adjusting to life after your hospital stay. You can come up with new ways to handle changes that give you more control. Your family doctor or care team can help you find a counselor.     Get help if you re thinking about hurting yourself. If you need help right away, call 911 or go to the nearest emergency room. You can also try the Crisis Text Line.    Crisis Text Line: 312-280 (http://www.crisistextline.org)  The Crisis Text Line serves anyone, in any crisis. It gives free, 24/7 support. Here's how it works:  Text HOME to 219662 from anywhere in the [place], anytime, about any type of crisis.  A live, trained Crisis Counselor will text you back quickly.  The volunteer Crisis Counselor can help you move from a  hot moment  to a  cool moment.  They can also help you work out a safety plan.

## 2024-03-06 ENCOUNTER — E-VISIT (OUTPATIENT)
Dept: FAMILY MEDICINE | Facility: CLINIC | Age: 54
End: 2024-03-06
Payer: COMMERCIAL

## 2024-03-06 DIAGNOSIS — J01.80 ACUTE NON-RECURRENT SINUSITIS OF OTHER SINUS: Primary | ICD-10-CM

## 2024-03-06 PROCEDURE — 99421 OL DIG E/M SVC 5-10 MIN: CPT | Performed by: FAMILY MEDICINE

## 2024-03-07 NOTE — PATIENT INSTRUCTIONS
Thank you for choosing us for your care. I have placed an order for a prescription so that you can start treatment. View your full visit summary for details by clicking on the link below. Your pharmacist will able to address any questions you may have about the medication.     If you're not feeling better within 5-7 days, please schedule an appointment.  You can schedule an appointment right here in [facility], or call [phone]  If the visit is for the same symptoms as your eVisit, we'll refund the cost of your eVisit if seen within seven days.

## 2024-03-18 ENCOUNTER — NURSE TRIAGE (OUTPATIENT)
Dept: FAMILY MEDICINE | Facility: CLINIC | Age: 54
End: 2024-03-18
Payer: COMMERCIAL

## 2024-03-18 NOTE — TELEPHONE ENCOUNTER
Reason for Call:  Appointment Request    Patient requesting this type of appt:  follow up visit     Requested provider: Lefty Chang 1st, or open to anyone     Reason patient unable to be scheduled: Needs to be scheduled by clinic    When does patient want to be seen/preferred time:1-5 day     Comments:  pt want to follow up on sinusitis, not getting better will like sot see juan carlos or anyone at [facility] this week    Could we send this information to you [facility] or would you prefer to receive a phone call?:   Patient would prefer a phone call   Okay to leave a detailed message?: Yes at Cell number on file:    Telephone Information:   Mobile [phone]       Call taken on 3/18/2024 at 11:57 AM by Tracy Moncada

## 2024-03-19 ENCOUNTER — OFFICE VISIT (OUTPATIENT)
Dept: FAMILY MEDICINE | Facility: CLINIC | Age: 54
End: 2024-03-19
Payer: COMMERCIAL

## 2024-03-19 VITALS
OXYGEN SATURATION: 98 % | DIASTOLIC BLOOD PRESSURE: 86 MMHG | BODY MASS INDEX: 25.19 KG/M2 | HEART RATE: 64 BPM | HEIGHT: 68 IN | SYSTOLIC BLOOD PRESSURE: 135 MMHG | RESPIRATION RATE: 16 BRPM | WEIGHT: 166.2 LBS | TEMPERATURE: 98.1 F

## 2024-03-19 DIAGNOSIS — J32.0 CHRONIC MAXILLARY SINUSITIS: Primary | ICD-10-CM

## 2024-03-19 PROCEDURE — 99213 OFFICE O/P EST LOW 20 MIN: CPT | Performed by: NURSE PRACTITIONER

## 2024-03-19 RX ORDER — METHYLPREDNISOLONE 4 MG
TABLET, DOSE PACK ORAL
Qty: 21 TABLET | Refills: 0 | Status: SHIPPED | OUTPATIENT
Start: 2024-03-19 | End: 2024-08-06

## 2024-03-19 RX ORDER — CEFDINIR 300 MG/1
300 CAPSULE ORAL 2 TIMES DAILY
Qty: 14 CAPSULE | Refills: 0 | Status: SHIPPED | OUTPATIENT
Start: 2024-03-19 | End: 2024-03-26

## 2024-03-19 ASSESSMENT — ASTHMA QUESTIONNAIRES
ACT_TOTALSCORE: 22
QUESTION_2 LAST FOUR WEEKS HOW OFTEN HAVE YOU HAD SHORTNESS OF BREATH: THREE TO SIX TIMES A WEEK
QUESTION_5 LAST FOUR WEEKS HOW WOULD YOU RATE YOUR ASTHMA CONTROL: WELL CONTROLLED
ACT_TOTALSCORE: 22
QUESTION_4 LAST FOUR WEEKS HOW OFTEN HAVE YOU USED YOUR RESCUE INHALER OR NEBULIZER MEDICATION (SUCH AS ALBUTEROL): NOT AT ALL
QUESTION_3 LAST FOUR WEEKS HOW OFTEN DID YOUR ASTHMA SYMPTOMS (WHEEZING, COUGHING, SHORTNESS OF BREATH, CHEST TIGHTNESS OR PAIN) WAKE YOU UP AT NIGHT OR EARLIER THAN USUAL IN THE MORNING: NOT AT ALL
QUESTION_1 LAST FOUR WEEKS HOW MUCH OF THE TIME DID YOUR ASTHMA KEEP YOU FROM GETTING AS MUCH DONE AT WORK, SCHOOL OR AT HOME: NONE OF THE TIME

## 2024-03-19 ASSESSMENT — PAIN SCALES - GENERAL: PAINLEVEL: NO PAIN (0)

## 2024-03-19 NOTE — PROGRESS NOTES
"  Assessment & Plan     (J32.0) Chronic maxillary sinusitis  (primary encounter diagnosis)  Comment:   Plan: methylPREDNISolone (MEDROL DOSEPAK) 4 MG tablet        therapy pack, Adult ENT  Referral,         cefdinir (OMNICEF) 300 MG capsule,         DISCONTINUED: amoxicillin-clavulanate         (AUGMENTIN) 875-125 MG tablet        Will treat with Medrol Dosepak and continue with Flonase.  Will try a course of a different antibiotic.  Stop Afrin.  If symptoms are not improving, refer to ENT.       I spent a total of 22 minutes on the day of the visit.   Time spent by me doing chart review, history and exam, documentation and further activities per the note      BMI  Estimated body mass index is 25.34 kg/m  as calculated from the following:    Height as of this encounter: 1.725 m (5' 7.91\").    Weight as of this encounter: 75.4 kg (166 lb 3.2 oz).             Gennaro Nuñez is a 53 year old, presenting for the following health issues:  Sinus Problem        3/19/2024     3:52 PM   Additional Questions   Roomed by Mireille Torres   Accompanied by self     History of Present Illness       Reason for visit:  Sinus issue  Symptom onset:  More than a month  Symptom intensity:  Moderate  Symptom progression:  Staying the same  Had these symptoms before:  Yes  Has tried/received treatment for these symptoms:  Yes  Previous treatment was successful:  No  What makes it better:  Afrin    He eats 2-3 servings of fruits and vegetables daily.He consumes 2 sweetened beverage(s) daily.He exercises with enough effort to increase his heart rate 9 or less minutes per day.  He exercises with enough effort to increase his heart rate 3 or less days per week.   He is taking medications regularly.       He has had sinus symptoms since December/January.   Sinus congestion and pressure; ears plugged.  Bending over makes symptoms worse.  Tried Afrin and has been using intermittently for 3 weeks, started Flonase just yesterday.   He " "was treated with Augmentin for 10 days, some improvement, but then worsened again.  He denies any history of allergies.  He does use a CPAP, wasn't cleaning his equipment regularly, but has since done so.  Lately he hasn't been able to use his CPAP as his congestion is so severe.                     Objective    /86 (BP Location: Right arm, Patient Position: Sitting, Cuff Size: Adult Regular)   Pulse 64   Temp 98.1  F (36.7  C) (Temporal)   Resp 16   Ht 1.725 m (5' 7.91\")   Wt 75.4 kg (166 lb 3.2 oz)   SpO2 98%   BMI 25.34 kg/m    Body mass index is 25.34 kg/m .  Physical Exam   GENERAL: alert and no distress  HENT: normal cephalic/atraumatic, ear canals and TM's normal, nasal mucosa edematous , oropharynx clear, and oral mucous membranes moist  NECK: no adenopathy, no asymmetry, masses, or scars  RESP: lungs clear to auscultation - no rales, rhonchi or wheezes  CV: regular rate and rhythm, normal S1 S2, no S3 or S4, no murmur, click or rub, no peripheral edema  PSYCH: mentation appears normal, affect normal/bright            Signed Electronically by: Karrie Pacheco NP    "

## 2024-03-19 NOTE — PROGRESS NOTES
{PROVIDER CHARTING PREFERENCE:178397}    Gennaro Nuñez is a 53 year old, presenting for the following health issues:  No chief complaint on file.  {(!) Visit Details have not yet been documented.  Please enter Visit Details and then use this list to pull in documentation. (Optional):065523}  HPI     {MA/LPN/RN Pre-Provider Visit Orders- hCG/UA/Strep (Optional):054979}  {SUPERLIST (Optional):185172}  {additonal problems for provider to add (Optional):905281}    {ROS Picklists (Optional):283548}      Objective    There were no vitals taken for this visit.  There is no height or weight on file to calculate BMI.  Physical Exam   {Exam List (Optional):442550}    {Diagnostic Test Results (Optional):083301}        Signed Electronically by: Karrie Pacheco NP  {Email feedback regarding this note to [email or web address]   :503039}

## 2024-03-19 NOTE — TELEPHONE ENCOUNTER
"E-visit on 3/7 for sinusitis and was prescribed a round of abx  Has finished course of abx but little to no improvement  Primary sx at present are nasal drip and congestion, head feels \"clogged\"  Primarily mouth breathing  Afebrile, denies ear pain    Scheduled for in-clinic follow up today at 1600. Care advice given. The patient indicates understanding of these issues and agrees with the plan.    MORE MurilloN, RN  [facility]    Reason for Disposition   Sinus congestion (pressure, fullness) present > 10 days   Nasal discharge present > 10 days   Patient wants to be seen    Additional Information   Negative: SEVERE sinus pain   Negative: SEVERE headache   Negative: Redness or swelling on the cheek, forehead, or around the eye   Negative: Fever > 103 F (39.4 C)   Negative: Fever > 101 F (38.3 C) and over 60 years of age   Negative: Fever > 100.0 F (37.8 C) and has diabetes mellitus or a weak immune system (e.g., HIV positive, cancer chemotherapy, organ transplant, splenectomy, chronic steroids)   Negative: Fever > 100.0 F (37.8 C) and bedridden (e.g., CVA, chronic illness, recovering from surgery)   Negative: Fever present > 3 days (72 hours)   Negative: Fever returns after gone for over 24 hours and symptoms worse or not improved   Negative: Sinus pain (not just congestion) and fever   Negative: Earache   Negative: SEVERE headache and has fever   Negative: Patient sounds very sick or weak to the triager    Protocols used: Sinus Pain or Congestion-A-OH    "

## 2024-06-12 DIAGNOSIS — I10 ESSENTIAL HYPERTENSION: ICD-10-CM

## 2024-06-12 DIAGNOSIS — E78.5 HYPERLIPIDEMIA LDL GOAL <130: ICD-10-CM

## 2024-06-12 RX ORDER — AMLODIPINE BESYLATE 10 MG/1
10 TABLET ORAL DAILY
Qty: 90 TABLET | Refills: 0 | Status: SHIPPED | OUTPATIENT
Start: 2024-06-12 | End: 2024-08-06

## 2024-06-12 RX ORDER — ATORVASTATIN CALCIUM 40 MG/1
40 TABLET, FILM COATED ORAL DAILY
Qty: 90 TABLET | Refills: 0 | Status: SHIPPED | OUTPATIENT
Start: 2024-06-12 | End: 2024-08-06

## 2024-06-29 DIAGNOSIS — B00.1 RECURRENT HERPES LABIALIS: ICD-10-CM

## 2024-07-01 RX ORDER — VALACYCLOVIR HYDROCHLORIDE 1 G/1
TABLET, FILM COATED ORAL
Qty: 4 TABLET | Refills: 4 | Status: SHIPPED | OUTPATIENT
Start: 2024-07-01

## 2024-08-06 ENCOUNTER — OFFICE VISIT (OUTPATIENT)
Dept: FAMILY MEDICINE | Facility: CLINIC | Age: 54
End: 2024-08-06
Payer: COMMERCIAL

## 2024-08-06 VITALS
HEIGHT: 69 IN | WEIGHT: 171.7 LBS | TEMPERATURE: 97.8 F | BODY MASS INDEX: 25.43 KG/M2 | OXYGEN SATURATION: 100 % | SYSTOLIC BLOOD PRESSURE: 122 MMHG | RESPIRATION RATE: 21 BRPM | HEART RATE: 70 BPM | DIASTOLIC BLOOD PRESSURE: 68 MMHG

## 2024-08-06 DIAGNOSIS — B00.1 RECURRENT HERPES LABIALIS: ICD-10-CM

## 2024-08-06 DIAGNOSIS — E83.52 HIGH CALCIUM LEVELS: ICD-10-CM

## 2024-08-06 DIAGNOSIS — E78.5 HYPERLIPIDEMIA LDL GOAL <130: ICD-10-CM

## 2024-08-06 DIAGNOSIS — I10 ESSENTIAL HYPERTENSION: Primary | ICD-10-CM

## 2024-08-06 DIAGNOSIS — Z12.5 SCREENING PSA (PROSTATE SPECIFIC ANTIGEN): ICD-10-CM

## 2024-08-06 DIAGNOSIS — G47.33 OSA (OBSTRUCTIVE SLEEP APNEA): ICD-10-CM

## 2024-08-06 LAB
ALBUMIN SERPL BCG-MCNC: 4.8 G/DL (ref 3.5–5.2)
ALP SERPL-CCNC: 75 U/L (ref 40–150)
ALT SERPL W P-5'-P-CCNC: 46 U/L (ref 0–70)
ANION GAP SERPL CALCULATED.3IONS-SCNC: 12 MMOL/L (ref 7–15)
AST SERPL W P-5'-P-CCNC: 27 U/L (ref 0–45)
BILIRUB SERPL-MCNC: 0.6 MG/DL
BUN SERPL-MCNC: 14 MG/DL (ref 6–20)
CALCIUM SERPL-MCNC: 9.7 MG/DL (ref 8.8–10.4)
CHLORIDE SERPL-SCNC: 102 MMOL/L (ref 98–107)
CHOLEST SERPL-MCNC: 238 MG/DL
CREAT SERPL-MCNC: 0.87 MG/DL (ref 0.67–1.17)
EGFRCR SERPLBLD CKD-EPI 2021: >90 ML/MIN/1.73M2
FASTING STATUS PATIENT QL REPORTED: YES
FASTING STATUS PATIENT QL REPORTED: YES
GLUCOSE SERPL-MCNC: 106 MG/DL (ref 70–99)
HCO3 SERPL-SCNC: 26 MMOL/L (ref 22–29)
HDLC SERPL-MCNC: 89 MG/DL
LDLC SERPL CALC-MCNC: 122 MG/DL
NONHDLC SERPL-MCNC: 149 MG/DL
POTASSIUM SERPL-SCNC: 4.2 MMOL/L (ref 3.4–5.3)
PROT SERPL-MCNC: 7.6 G/DL (ref 6.4–8.3)
PSA SERPL DL<=0.01 NG/ML-MCNC: 1.88 NG/ML (ref 0–3.5)
SODIUM SERPL-SCNC: 140 MMOL/L (ref 135–145)
TRIGL SERPL-MCNC: 133 MG/DL

## 2024-08-06 PROCEDURE — G2211 COMPLEX E/M VISIT ADD ON: HCPCS | Performed by: FAMILY MEDICINE

## 2024-08-06 PROCEDURE — 80053 COMPREHEN METABOLIC PANEL: CPT | Performed by: FAMILY MEDICINE

## 2024-08-06 PROCEDURE — 99214 OFFICE O/P EST MOD 30 MIN: CPT | Performed by: FAMILY MEDICINE

## 2024-08-06 PROCEDURE — G0103 PSA SCREENING: HCPCS | Performed by: FAMILY MEDICINE

## 2024-08-06 PROCEDURE — 36415 COLL VENOUS BLD VENIPUNCTURE: CPT | Performed by: FAMILY MEDICINE

## 2024-08-06 PROCEDURE — 80061 LIPID PANEL: CPT | Performed by: FAMILY MEDICINE

## 2024-08-06 RX ORDER — ATORVASTATIN CALCIUM 40 MG/1
40 TABLET, FILM COATED ORAL DAILY
Qty: 90 TABLET | Refills: 3 | Status: SHIPPED | OUTPATIENT
Start: 2024-09-09

## 2024-08-06 RX ORDER — AMLODIPINE BESYLATE 10 MG/1
10 TABLET ORAL DAILY
Qty: 90 TABLET | Refills: 3 | Status: SHIPPED | OUTPATIENT
Start: 2024-09-09

## 2024-08-06 ASSESSMENT — PAIN SCALES - GENERAL: PAINLEVEL: NO PAIN (0)

## 2024-08-06 NOTE — PROGRESS NOTES
"  Assessment & Plan     Essential hypertension  Patient is tolerating current medication without any major side effects of concerns and current dose seems reasonable too.  Current medication regime is effective. Continue current treatment without any changes.   - COMPREHENSIVE METABOLIC PANEL; Future  - amLODIPine (NORVASC) 10 MG tablet; Take 1 tablet (10 mg) by mouth daily  - COMPREHENSIVE METABOLIC PANEL    Hyperlipidemia LDL goal <130  Patient is tolerating current medication without any major side effects of concerns and current dose seems reasonable too.  Current medication regime is effective. Continue current treatment without any changes.   - Lipid panel reflex to direct LDL Non-fasting; Future  - atorvastatin (LIPITOR) 40 MG tablet; Take 1 tablet (40 mg) by mouth daily  - Lipid panel reflex to direct LDL Non-fasting    REYES (obstructive sleep apnea)  Not tolerating cpap machine. Will follow up with sleep specialist.   - Adult Sleep Eval & Management  Referral; Future    Recurrent herpes labialis  Ok to call for refills for valtrex. Currently has enough.     High calcium levels   Incidental. Asymptomatic. Recheck. If abnormal, consider ionized calcium and PTH workup. He understood.     Screening PSA (prostate specific antigen)     - PSA, screen; Future  - PSA, screen          BMI  Estimated body mass index is 25.36 kg/m  as calculated from the following:    Height as of this encounter: 1.753 m (5' 9\").    Weight as of this encounter: 77.9 kg (171 lb 11.2 oz).   Weight management plan: Discussed healthy diet and exercise guidelines           Gennaro Nuñez is a 53 year old, presenting for the following health issues:  Recheck Medication (//Follow up on  Amlodipine, atorvastatin and is requesting future refill for valACYclovir.)      8/6/2024     7:40 AM   Additional Questions   Roomed by AKBAR Guerrero   Accompanied by self         8/6/2024     7:40 AM   Patient Reported Additional Medications " "  Patient reports taking the following new medications none     History of Present Illness       Reason for visit:  Yearly physical    He eats 2-3 servings of fruits and vegetables daily.He consumes 1 sweetened beverage(s) daily.He exercises with enough effort to increase his heart rate 9 or less minutes per day.  He exercises with enough effort to increase his heart rate 3 or less days per week.   He is taking medications regularly.     Busy with work.     Cpap - can't do it. Doing it for few hours but then stops.     No major side effects with amlodipine.     No issue with statin.     Valtrex. Needing around 4-5 times per year for cold sores.     Albuterol not needing it regularly. Only when sick or allergies.     Allergies - allegra and clairitin uses alternatively monthly.     Skyrizi - used and not needing steroid. Now skyrizi is expensive and not using any medication and doing well.                Objective    /68 (BP Location: Right arm, Patient Position: Sitting, Cuff Size: Adult Regular)   Pulse 70   Temp 97.8  F (36.6  C) (Temporal)   Resp 21   Ht 1.753 m (5' 9\")   Wt 77.9 kg (171 lb 11.2 oz)   SpO2 100%   BMI 25.36 kg/m    Body mass index is 25.36 kg/m .  Physical Exam               Signed Electronically by: Lefty Chang MD, MD    "

## 2024-10-27 ENCOUNTER — HEALTH MAINTENANCE LETTER (OUTPATIENT)
Age: 54
End: 2024-10-27

## 2025-04-05 ENCOUNTER — HOSPITAL ENCOUNTER (EMERGENCY)
Facility: CLINIC | Age: 55
Discharge: HOME | End: 2025-04-05
Attending: PHYSICIAN ASSISTANT | Admitting: PHYSICIAN ASSISTANT
Payer: COMMERCIAL

## 2025-04-05 ENCOUNTER — OFFICE VISIT (OUTPATIENT)
Dept: URGENT CARE | Facility: URGENT CARE | Age: 55
End: 2025-04-05
Payer: COMMERCIAL

## 2025-04-05 VITALS
SYSTOLIC BLOOD PRESSURE: 170 MMHG | TEMPERATURE: 98.2 F | OXYGEN SATURATION: 98 % | HEART RATE: 80 BPM | RESPIRATION RATE: 16 BRPM | DIASTOLIC BLOOD PRESSURE: 103 MMHG

## 2025-04-05 VITALS
OXYGEN SATURATION: 98 % | WEIGHT: 173 LBS | SYSTOLIC BLOOD PRESSURE: 130 MMHG | TEMPERATURE: 98 F | RESPIRATION RATE: 18 BRPM | BODY MASS INDEX: 25.62 KG/M2 | DIASTOLIC BLOOD PRESSURE: 86 MMHG | HEIGHT: 69 IN | HEART RATE: 82 BPM

## 2025-04-05 DIAGNOSIS — T15.91XA FOREIGN BODY OF RIGHT EYE, INITIAL ENCOUNTER: ICD-10-CM

## 2025-04-05 DIAGNOSIS — T15.91XA FOREIGN BODY, EYE, RIGHT, INITIAL ENCOUNTER: Primary | ICD-10-CM

## 2025-04-05 PROCEDURE — 65210 REMOVE FOREIGN BODY FROM EYE: CPT | Mod: RT

## 2025-04-05 PROCEDURE — 99207 PR NO CHARGE LOS: CPT | Performed by: INTERNAL MEDICINE

## 2025-04-05 PROCEDURE — 3079F DIAST BP 80-89 MM HG: CPT | Performed by: INTERNAL MEDICINE

## 2025-04-05 PROCEDURE — 99283 EMERGENCY DEPT VISIT LOW MDM: CPT | Mod: 25

## 2025-04-05 PROCEDURE — 3075F SYST BP GE 130 - 139MM HG: CPT | Performed by: INTERNAL MEDICINE

## 2025-04-05 PROCEDURE — 250N000009 HC RX 250: Performed by: PHYSICIAN ASSISTANT

## 2025-04-05 RX ORDER — POLYMYXIN B SULFATE AND TRIMETHOPRIM 1; 10000 MG/ML; [USP'U]/ML
1 SOLUTION OPHTHALMIC 4 TIMES DAILY
Qty: 10 ML | Refills: 0 | Status: SHIPPED | OUTPATIENT
Start: 2025-04-05 | End: 2025-04-08

## 2025-04-05 RX ORDER — TETRACAINE HYDROCHLORIDE 5 MG/ML
1-2 SOLUTION OPHTHALMIC ONCE
Status: COMPLETED | OUTPATIENT
Start: 2025-04-05 | End: 2025-04-05

## 2025-04-05 RX ORDER — POLYMYXIN B SULFATE AND TRIMETHOPRIM 1; 10000 MG/ML; [USP'U]/ML
1 SOLUTION OPHTHALMIC 4 TIMES DAILY
Qty: 10 ML | Refills: 0 | Status: SHIPPED | OUTPATIENT
Start: 2025-04-05 | End: 2025-04-05

## 2025-04-05 RX ADMIN — FLUORESCEIN SODIUM 1 STRIP: 1 STRIP OPHTHALMIC at 15:21

## 2025-04-05 RX ADMIN — TETRACAINE HYDROCHLORIDE 2 DROP: 5 SOLUTION OPHTHALMIC at 15:21

## 2025-04-05 ASSESSMENT — VISUAL ACUITY
OS: 20/25;WITH CORRECTIVE LENSES
OD: 20/25;WITH CORRECTIVE LENSES
OU: 20/25;WITH CORRECTIVE LENSES

## 2025-04-05 ASSESSMENT — ACTIVITIES OF DAILY LIVING (ADL)
ADLS_ACUITY_SCORE: 41

## 2025-04-05 NOTE — ED PROVIDER NOTES
Emergency Department Note      History of Present Illness     Chief Complaint   Foreign Body in Eye      HPI   Joaquin Dacosta is a 54 year old male who presents to the ED with concern for foreign body in the eye. Patient states that he was doing putting self-tapping screws into duct work above his head wearing glasses and a hat when a metal shard went into his right eye. No vision changes. He notes FB sensation to eye. No contact use.    Independent Historian   None    Review of External Notes   [facility] - tetanus 2022    Past Medical History     Medical History and Problem List   Past Medical History:   Diagnosis Date    Allergic rhinitis, cause unspecified     Arthritis     Cancer (H)     Essential hypertension 2/15/2023    Hyperlipidemia LDL goal <130     Moderate persistent asthma     Psoriasis     Recurrent herpes labialis 10/28/2008       Medications   polymixin b-trimethoprim (POLYTRIM) 21005-2.1 UNIT/ML-% ophthalmic solution  albuterol (PROAIR HFA/PROVENTIL HFA/VENTOLIN HFA) 108 (90 Base) MCG/ACT inhaler  amLODIPine (NORVASC) 10 MG tablet  atorvastatin (LIPITOR) 40 MG tablet  desonide (DESOWEN) 0.05 % external cream  Fexofenadine HCl (ALLEGRA PO)  FIBER ADULT GUMMIES PO  MULTI-VITAMIN OR TABS  UNABLE TO FIND  valACYclovir (VALTREX) 1000 mg tablet        Surgical History   Past Surgical History:   Procedure Laterality Date    COLONOSCOPY N/A 11/4/2022    Procedure: COLONOSCOPY, FLEXIBLE, WITH LESION REMOVAL USING SNARE;  Surgeon: Star Harrington MD;  Location: [facility] NONSPECIFIC PROCEDURE  1971    surgery- feet - infant Club Foot.     [facility] ORAL SURGERY PROCEDURE  1989    Echo Teeth Removal       Physical Exam     Patient Vitals for the past 24 hrs:   BP Temp Temp src Pulse Resp SpO2   04/05/25 1253 (!) 170/103 98.2  F (36.8  C) Temporal 80 16 98 %     Physical Exam  Constitutional: Pleasant. Cooperative. Non-toxic appearing.  HEENT: Pupils equally round and reactive. Trace right conjunctival injection.  No discharge. No lid edema. Obvious metallic appearing FB noted to 5 o'clock of edge of cornea. After removal, fluorescein exam reveals scant uptake at region where FB was formerly lodged. Negative James sign. VA 20/25 in right eye, 20/25 in left eye.  Respiratory: Normal respiratory effort, lungs are clear bilaterally.  Skin: Normal, without rash. No periorbital erythema.  Neurologic: Cranial nerves grossly intact. Alert.  Nursing notes and vital signs reviewed.       Diagnostics     Lab Results   Labs Ordered and Resulted from Time of ED Arrival to Time of ED Departure - No data to display    Imaging   No orders to display     Independent Interpretation   None    ED Course      Medications Administered   Medications   tetracaine (PONTOCAINE) 0.5 % ophthalmic solution 1-2 drop (2 drops Right Eye $Given 4/5/25 1521)   fluorescein (FUL-JOSÉ ANTONIO) ophthalmic strip 1 strip (1 strip Right Eye $Given 4/5/25 1521)       Procedures   [facility]    Foreign Body Removal - Ocular    Date/Time: 4/5/2025 3:21 PM    Performed by: Richie Ferguson PA-C  Authorized by: Richie Ferguson PA-C    Risks, benefits and alternatives discussed.      LOCATION     Location:  R corneal    Depth:  Superficial    ANESTHESIA (see MAR for exact dosages):     Local anesthetic:  Tetracaine drops    PROCEDURE DETAILS     Localization method:  Direct visualization    Removal mechanism:  Moist cotton swab    Foreign bodies recovered:  1    Description:  Metalllic triangular shaped FB    Intact foreign body removal: yes      Residual rust ring observed: no      POST PROCEDURE DETAILS     Confirmation:  No additional foreign bodies on visualization    Dressing:  Antibiotic drops      PROCEDURE    Patient Tolerance:  Patient tolerated the procedure well with no immediate complications       Discussion of Management   None    ED Course        Additional Documentation  None    Medical Decision Making / Diagnosis     CMS Diagnoses:  None    [facility]       None    [facility]   Joaquin Dacosta is a 54 year old male who does not wear contacts who presents to the ED with concern for foreign body to the right eye.  He notes that he feels a foreign body sensation and can see an obvious foreign body to the right eye consistent with a metallic foreign body given what he was doing when it was sustained.  No vision changes.  See HPI as above for additional details.  Vitals and physical exam as above.  Foreign body removed using moist cotton swab.  No suggestion for perforated globe at this time.  Remainder of exam is unremarkable.  Visual acuity is at patient's baseline per patient.  Advised Tylenol and ibuprofen for pain.  Will start patient on antibiotic drops as below.  Advise close follow-up with his eye doctor for reevaluation in next few days to ensure improvement. Tetanus UTD. Do feel patient safe for discharge to home.  Discussed reasons to return. All questions answered. Patient discharged to home in stable condition.    Disposition   The patient was discharged.     Diagnosis     ICD-10-CM    1. Foreign body of right eye, initial encounter  T15.91XA            Discharge Medications   Current Discharge Medication List        START taking these medications    Details   polymixin b-trimethoprim (POLYTRIM) 28476-5.1 UNIT/ML-% ophthalmic solution Place 1 drop into the right eye 4 times daily for 3 days.  Qty: 10 mL, Refills: 0             This record was created at least in part using electronic voice recognition software, so please excuse any typographical errors.         Richie Ferguson PA-C  04/05/25 154

## 2025-04-05 NOTE — ED TRIAGE NOTES
"Patient was using a \"self-tapping\" screw while doing some duct work above his head, a small metal shard of the screw landed in the right eye. It is to the left side of the pupil. Patient denies any vision changes, but the eye is tearing, and he denies pain but states he can tell something is in his eye.     Triage Assessment (Adult)       Row Name 04/05/25 0354          Triage Assessment    Airway WDL WDL        Respiratory WDL    Respiratory WDL WDL        Skin Circulation/Temperature WDL    Skin Circulation/Temperature WDL WDL        Cardiac WDL    Cardiac WDL WDL        Peripheral/Neurovascular WDL    Peripheral Neurovascular WDL WDL        Cognitive/Neuro/Behavioral WDL    Cognitive/Neuro/Behavioral WDL WDL                     "

## 2025-04-05 NOTE — DISCHARGE INSTRUCTIONS
The foreign body has been removed. Use antibiotic drops as prescribed. Use Tylenol and ibuprofen for pain. Follow up closely with eye doctor in the next 3-5 days to ensure defect is healing properly.

## 2025-04-05 NOTE — PROGRESS NOTES
Brief triage note:  Patient was working with sheet metal and installing screws with a power screwdriver when a piece snapped and he felt something enter the right eye. Was wearing a hat and safety glasses.   Examination of the right eye shows an embedded metallic object at the medial corneal limbus on the right.    ASSESSMENT/PLAN:    ICD-10-CM    1. Foreign body, eye, right, initial encounter  T15.91XA       Referred to ER for treatment as we do not have the proper equipment for safely and effectively removing a metallic foreign body from the eye.    Guillaume Webb MD

## 2025-08-23 SDOH — HEALTH STABILITY: PHYSICAL HEALTH: ON AVERAGE, HOW MANY DAYS PER WEEK DO YOU ENGAGE IN MODERATE TO STRENUOUS EXERCISE (LIKE A BRISK WALK)?: 4 DAYS

## 2025-08-23 SDOH — HEALTH STABILITY: PHYSICAL HEALTH: ON AVERAGE, HOW MANY MINUTES DO YOU ENGAGE IN EXERCISE AT THIS LEVEL?: 10 MIN

## 2025-08-23 ASSESSMENT — ASTHMA QUESTIONNAIRES
QUESTION_3 LAST FOUR WEEKS HOW OFTEN DID YOUR ASTHMA SYMPTOMS (WHEEZING, COUGHING, SHORTNESS OF BREATH, CHEST TIGHTNESS OR PAIN) WAKE YOU UP AT NIGHT OR EARLIER THAN USUAL IN THE MORNING: NOT AT ALL
QUESTION_5 LAST FOUR WEEKS HOW WOULD YOU RATE YOUR ASTHMA CONTROL: COMPLETELY CONTROLLED
QUESTION_4 LAST FOUR WEEKS HOW OFTEN HAVE YOU USED YOUR RESCUE INHALER OR NEBULIZER MEDICATION (SUCH AS ALBUTEROL): NOT AT ALL
ACT_TOTALSCORE: 25
QUESTION_2 LAST FOUR WEEKS HOW OFTEN HAVE YOU HAD SHORTNESS OF BREATH: NOT AT ALL
QUESTION_1 LAST FOUR WEEKS HOW MUCH OF THE TIME DID YOUR ASTHMA KEEP YOU FROM GETTING AS MUCH DONE AT WORK, SCHOOL OR AT HOME: NONE OF THE TIME

## 2025-08-26 ENCOUNTER — OFFICE VISIT (OUTPATIENT)
Dept: FAMILY MEDICINE | Facility: CLINIC | Age: 55
End: 2025-08-26
Payer: COMMERCIAL

## 2025-08-26 VITALS
SYSTOLIC BLOOD PRESSURE: 130 MMHG | HEIGHT: 69 IN | RESPIRATION RATE: 17 BRPM | OXYGEN SATURATION: 98 % | TEMPERATURE: 97.9 F | BODY MASS INDEX: 25.52 KG/M2 | HEART RATE: 70 BPM | DIASTOLIC BLOOD PRESSURE: 82 MMHG | WEIGHT: 172.3 LBS

## 2025-08-26 DIAGNOSIS — B35.6 TINEA CRURIS: ICD-10-CM

## 2025-08-26 DIAGNOSIS — Z12.5 SCREENING FOR PROSTATE CANCER: ICD-10-CM

## 2025-08-26 DIAGNOSIS — R73.09 ELEVATED GLUCOSE: ICD-10-CM

## 2025-08-26 DIAGNOSIS — F10.90 ALCOHOL USE: ICD-10-CM

## 2025-08-26 DIAGNOSIS — M25.511 CHRONIC RIGHT SHOULDER PAIN: ICD-10-CM

## 2025-08-26 DIAGNOSIS — G89.29 CHRONIC RIGHT SHOULDER PAIN: ICD-10-CM

## 2025-08-26 DIAGNOSIS — Z00.00 ROUTINE GENERAL MEDICAL EXAMINATION AT A HEALTH CARE FACILITY: Primary | ICD-10-CM

## 2025-08-26 DIAGNOSIS — Z12.83 SKIN CANCER SCREENING: ICD-10-CM

## 2025-08-26 DIAGNOSIS — I10 ESSENTIAL HYPERTENSION: ICD-10-CM

## 2025-08-26 DIAGNOSIS — B00.1 RECURRENT HERPES LABIALIS: ICD-10-CM

## 2025-08-26 DIAGNOSIS — E78.5 HYPERLIPIDEMIA LDL GOAL <130: ICD-10-CM

## 2025-08-26 LAB
ALBUMIN SERPL BCG-MCNC: 4.6 G/DL (ref 3.5–5.2)
ALP SERPL-CCNC: 71 U/L (ref 40–150)
ALT SERPL W P-5'-P-CCNC: 39 U/L (ref 0–70)
ANION GAP SERPL CALCULATED.3IONS-SCNC: 11 MMOL/L (ref 7–15)
AST SERPL W P-5'-P-CCNC: 28 U/L (ref 0–45)
BILIRUB SERPL-MCNC: 0.7 MG/DL
BUN SERPL-MCNC: 14.1 MG/DL (ref 6–20)
CALCIUM SERPL-MCNC: 9.6 MG/DL (ref 8.8–10.4)
CHLORIDE SERPL-SCNC: 102 MMOL/L (ref 98–107)
CHOLEST SERPL-MCNC: 205 MG/DL
CREAT SERPL-MCNC: 0.93 MG/DL (ref 0.67–1.17)
EGFRCR SERPLBLD CKD-EPI 2021: >90 ML/MIN/1.73M2
EST. AVERAGE GLUCOSE BLD GHB EST-MCNC: 114 MG/DL
FASTING STATUS PATIENT QL REPORTED: YES
FASTING STATUS PATIENT QL REPORTED: YES
GLUCOSE SERPL-MCNC: 108 MG/DL (ref 70–99)
HBA1C MFR BLD: 5.6 % (ref 0–5.6)
HCO3 SERPL-SCNC: 26 MMOL/L (ref 22–29)
HDLC SERPL-MCNC: 85 MG/DL
LDLC SERPL CALC-MCNC: 104 MG/DL
NONHDLC SERPL-MCNC: 120 MG/DL
POTASSIUM SERPL-SCNC: 3.9 MMOL/L (ref 3.4–5.3)
PROT SERPL-MCNC: 7.3 G/DL (ref 6.4–8.3)
PSA SERPL DL<=0.01 NG/ML-MCNC: 2.07 NG/ML (ref 0–3.5)
SODIUM SERPL-SCNC: 139 MMOL/L (ref 135–145)
TRIGL SERPL-MCNC: 81 MG/DL

## 2025-08-26 PROCEDURE — 90677 PCV20 VACCINE IM: CPT | Performed by: FAMILY MEDICINE

## 2025-08-26 PROCEDURE — 3079F DIAST BP 80-89 MM HG: CPT | Performed by: FAMILY MEDICINE

## 2025-08-26 PROCEDURE — 3075F SYST BP GE 130 - 139MM HG: CPT | Performed by: FAMILY MEDICINE

## 2025-08-26 PROCEDURE — G2211 COMPLEX E/M VISIT ADD ON: HCPCS | Performed by: FAMILY MEDICINE

## 2025-08-26 PROCEDURE — 83036 HEMOGLOBIN GLYCOSYLATED A1C: CPT | Performed by: FAMILY MEDICINE

## 2025-08-26 PROCEDURE — 90472 IMMUNIZATION ADMIN EACH ADD: CPT | Performed by: FAMILY MEDICINE

## 2025-08-26 PROCEDURE — 36415 COLL VENOUS BLD VENIPUNCTURE: CPT | Performed by: FAMILY MEDICINE

## 2025-08-26 PROCEDURE — 3049F LDL-C 100-129 MG/DL: CPT | Performed by: FAMILY MEDICINE

## 2025-08-26 PROCEDURE — 80061 LIPID PANEL: CPT | Performed by: FAMILY MEDICINE

## 2025-08-26 PROCEDURE — 90471 IMMUNIZATION ADMIN: CPT | Performed by: FAMILY MEDICINE

## 2025-08-26 PROCEDURE — 1126F AMNT PAIN NOTED NONE PRSNT: CPT | Performed by: FAMILY MEDICINE

## 2025-08-26 PROCEDURE — 90746 HEPB VACCINE 3 DOSE ADULT IM: CPT | Performed by: FAMILY MEDICINE

## 2025-08-26 PROCEDURE — 80053 COMPREHEN METABOLIC PANEL: CPT | Performed by: FAMILY MEDICINE

## 2025-08-26 PROCEDURE — 99396 PREV VISIT EST AGE 40-64: CPT | Mod: 25 | Performed by: FAMILY MEDICINE

## 2025-08-26 PROCEDURE — 3044F HG A1C LEVEL LT 7.0%: CPT | Performed by: FAMILY MEDICINE

## 2025-08-26 PROCEDURE — 99214 OFFICE O/P EST MOD 30 MIN: CPT | Mod: 25 | Performed by: FAMILY MEDICINE

## 2025-08-26 PROCEDURE — G0103 PSA SCREENING: HCPCS | Performed by: FAMILY MEDICINE

## 2025-08-26 RX ORDER — ATORVASTATIN CALCIUM 40 MG/1
40 TABLET, FILM COATED ORAL DAILY
Qty: 90 TABLET | Refills: 3 | Status: SHIPPED | OUTPATIENT
Start: 2025-08-26

## 2025-08-26 RX ORDER — AMLODIPINE BESYLATE 10 MG/1
10 TABLET ORAL DAILY
Qty: 90 TABLET | Refills: 3 | Status: SHIPPED | OUTPATIENT
Start: 2025-08-26

## 2025-08-26 RX ORDER — VALACYCLOVIR HYDROCHLORIDE 1 G/1
TABLET, FILM COATED ORAL
Qty: 4 TABLET | Refills: 4 | Status: SHIPPED | OUTPATIENT
Start: 2025-08-26

## 2025-08-26 RX ORDER — TERBINAFINE HYDROCHLORIDE 250 MG/1
250 TABLET ORAL DAILY
Qty: 14 TABLET | Refills: 0 | Status: SHIPPED | OUTPATIENT
Start: 2025-08-26 | End: 2025-09-09

## 2025-08-26 ASSESSMENT — PAIN SCALES - GENERAL: PAINLEVEL_OUTOF10: NO PAIN (0)

## (undated) RX ORDER — FENTANYL CITRATE 50 UG/ML
INJECTION, SOLUTION INTRAMUSCULAR; INTRAVENOUS
Status: DISPENSED
Start: 2022-11-04